# Patient Record
Sex: FEMALE | Race: WHITE | Employment: FULL TIME | URBAN - METROPOLITAN AREA
[De-identification: names, ages, dates, MRNs, and addresses within clinical notes are randomized per-mention and may not be internally consistent; named-entity substitution may affect disease eponyms.]

---

## 2021-09-07 ENCOUNTER — TRANSCRIBE ORDER (OUTPATIENT)
Dept: SCHEDULING | Age: 41
End: 2021-09-07

## 2021-09-07 DIAGNOSIS — Z12.31 SCREENING MAMMOGRAM FOR HIGH-RISK PATIENT: Primary | ICD-10-CM

## 2021-09-10 ENCOUNTER — HOSPITAL ENCOUNTER (OUTPATIENT)
Dept: MAMMOGRAPHY | Age: 41
Discharge: HOME | End: 2021-09-10
Attending: OBSTETRICS & GYNECOLOGY
Payer: COMMERCIAL

## 2021-09-10 DIAGNOSIS — Z12.31 SCREENING MAMMOGRAM FOR HIGH-RISK PATIENT: ICD-10-CM

## 2021-09-10 PROCEDURE — 77063 BREAST TOMOSYNTHESIS BI: CPT

## 2021-10-20 ENCOUNTER — HOSPITAL ENCOUNTER (EMERGENCY)
Age: 41
Discharge: HOME | End: 2021-10-20
Attending: EMERGENCY MEDICINE
Payer: COMMERCIAL

## 2021-10-20 ENCOUNTER — APPOINTMENT (OUTPATIENT)
Dept: CT IMAGING | Age: 41
End: 2021-10-20
Attending: EMERGENCY MEDICINE
Payer: COMMERCIAL

## 2021-10-20 VITALS
SYSTOLIC BLOOD PRESSURE: 146 MMHG | OXYGEN SATURATION: 100 % | TEMPERATURE: 98.4 F | WEIGHT: 195 LBS | BODY MASS INDEX: 35.88 KG/M2 | RESPIRATION RATE: 18 BRPM | HEART RATE: 96 BPM | DIASTOLIC BLOOD PRESSURE: 91 MMHG | HEIGHT: 62 IN

## 2021-10-20 DIAGNOSIS — I10 PRIMARY HYPERTENSION: ICD-10-CM

## 2021-10-20 DIAGNOSIS — G44.209 TENSION-TYPE HEADACHE, NOT INTRACTABLE, UNSPECIFIED CHRONICITY PATTERN: Primary | ICD-10-CM

## 2021-10-20 PROCEDURE — 74011250636 HC RX REV CODE- 250/636: Performed by: EMERGENCY MEDICINE

## 2021-10-20 PROCEDURE — 70450 CT HEAD/BRAIN W/O DYE: CPT

## 2021-10-20 PROCEDURE — 74011250637 HC RX REV CODE- 250/637: Performed by: EMERGENCY MEDICINE

## 2021-10-20 PROCEDURE — 99283 EMERGENCY DEPT VISIT LOW MDM: CPT

## 2021-10-20 PROCEDURE — 96372 THER/PROPH/DIAG INJ SC/IM: CPT

## 2021-10-20 RX ORDER — KETOROLAC TROMETHAMINE 30 MG/ML
30 INJECTION, SOLUTION INTRAMUSCULAR; INTRAVENOUS
Status: COMPLETED | OUTPATIENT
Start: 2021-10-20 | End: 2021-10-20

## 2021-10-20 RX ORDER — BENAZEPRIL HYDROCHLORIDE 40 MG/1
40 TABLET ORAL DAILY
COMMUNITY
Start: 2021-09-15

## 2021-10-20 RX ORDER — METOPROLOL TARTRATE 25 MG/1
25 TABLET, FILM COATED ORAL
Status: COMPLETED | OUTPATIENT
Start: 2021-10-20 | End: 2021-10-20

## 2021-10-20 RX ORDER — ONDANSETRON 4 MG/1
4 TABLET, ORALLY DISINTEGRATING ORAL
Status: COMPLETED | OUTPATIENT
Start: 2021-10-20 | End: 2021-10-20

## 2021-10-20 RX ORDER — CITALOPRAM 20 MG/1
20 TABLET, FILM COATED ORAL DAILY
COMMUNITY
Start: 2021-09-15

## 2021-10-20 RX ORDER — DIVALPROEX SODIUM 500 MG/1
500 TABLET, DELAYED RELEASE ORAL
Status: COMPLETED | OUTPATIENT
Start: 2021-10-20 | End: 2021-10-20

## 2021-10-20 RX ORDER — OXYCODONE AND ACETAMINOPHEN 5; 325 MG/1; MG/1
1 TABLET ORAL
Status: COMPLETED | OUTPATIENT
Start: 2021-10-20 | End: 2021-10-20

## 2021-10-20 RX ORDER — ATORVASTATIN CALCIUM 20 MG/1
20 TABLET, FILM COATED ORAL DAILY
COMMUNITY
Start: 2021-09-15

## 2021-10-20 RX ORDER — AMLODIPINE BESYLATE 5 MG/1
5 TABLET ORAL DAILY
COMMUNITY
Start: 2021-09-15

## 2021-10-20 RX ADMIN — METOPROLOL TARTRATE 25 MG: 25 TABLET, FILM COATED ORAL at 21:38

## 2021-10-20 RX ADMIN — ONDANSETRON 4 MG: 4 TABLET, ORALLY DISINTEGRATING ORAL at 20:37

## 2021-10-20 RX ADMIN — DIVALPROEX SODIUM 500 MG: 500 TABLET, DELAYED RELEASE ORAL at 21:15

## 2021-10-20 RX ADMIN — KETOROLAC TROMETHAMINE 30 MG: 30 INJECTION, SOLUTION INTRAMUSCULAR at 20:37

## 2021-10-20 RX ADMIN — OXYCODONE AND ACETAMINOPHEN 1 TABLET: 5; 325 TABLET ORAL at 21:15

## 2021-10-20 NOTE — ED TRIAGE NOTES
Patient drove to ED; states she is having a severe headache and a slight chill duee to her blood pressure; states she was unable to relieve her headache at home and took her BP meds this morning

## 2021-10-21 NOTE — ED NOTES
PT D/C WITH INSTRUCTIONS AND AMBULATORY. PT MEDICATED FOR ELEVATED BP PRIOR TO D/C. NO IV ACCESS AT THIS TIME. PT VS DOCUMENTED. PT INSTRUCTED TO TAKEN HOME MED AS RX.

## 2021-10-21 NOTE — ED PROVIDER NOTES
Patient presents with complaint of a severe headache , since this AM. This is the worst headache ever. + Nausea and photophobia. Also with elevated blood pressure. Duration:  1 day    Intensity: severe    Modified by: light, noise. Past Medical History:   Diagnosis Date    Hyperlipidemia     Hypertension        Past Surgical History:   Procedure Laterality Date    HX TUBAL LIGATION           Family History:   Problem Relation Age of Onset    Prostate Cancer Father        Social History     Socioeconomic History    Marital status:      Spouse name: Not on file    Number of children: Not on file    Years of education: Not on file    Highest education level: Not on file   Occupational History    Not on file   Tobacco Use    Smoking status: Never Smoker    Smokeless tobacco: Never Used   Vaping Use    Vaping Use: Never used   Substance and Sexual Activity    Alcohol use: Never    Drug use: Never    Sexual activity: Yes     Partners: Male     Birth control/protection: Pill   Other Topics Concern    Not on file   Social History Narrative    Not on file     Social Determinants of Health     Financial Resource Strain:     Difficulty of Paying Living Expenses:    Food Insecurity:     Worried About Running Out of Food in the Last Year:     [address] in the Last Year:    Transportation Needs:     Lack of Transportation (Medical):      Lack of Transportation (Non-Medical):    Physical Activity:     Days of Exercise per Week:     Minutes of Exercise per Session:    Stress:     Feeling of Stress :    Social Connections:     Frequency of Communication with Friends and Family:     Frequency of Social Gatherings with Friends and Family:     Attends [employer] Services:     Active Member of Clubs or Organizations:     Attends Club or Organization Meetings:     Marital Status:    Intimate Partner Violence:     Fear of Current or Ex-Partner:     Emotionally Abused:     Physically Abused:     Sexually Abused: ALLERGIES: Patient has no known allergies. Review of Systems   Constitutional: Negative. Negative for chills and fever. HENT: Negative. Eyes: Positive for photophobia. Respiratory: Negative. Cardiovascular: Negative. Gastrointestinal: Positive for nausea and vomiting. Endocrine: Negative. Genitourinary: Negative. Skin: Negative. Allergic/Immunologic: Negative. Neurological: Positive for headaches. Hematological: Negative. Psychiatric/Behavioral: Negative. All other systems reviewed and are negative. Vitals:    10/20/21 1946 10/20/21 2014   BP: (!) 181/101 (!) 147/77   Pulse: (!) 110 100   Resp: 20 18   Temp: 98.4 °F (36.9 °C)    SpO2: 98% 100%   Weight: 88.5 kg (195 lb)    Height: 5' 2\" (1.575 m)             Physical Exam  Vitals and nursing note reviewed. Constitutional:       Appearance: She is well-developed. HENT:      Head: Normocephalic and atraumatic. Eyes:      Extraocular Movements: Extraocular movements intact. Pupils: Pupils are equal, round, and reactive to light. Cardiovascular:      Rate and Rhythm: Normal rate and regular rhythm. Heart sounds: Normal heart sounds. Pulmonary:      Breath sounds: Normal breath sounds. Abdominal:      General: Bowel sounds are normal.      Palpations: Abdomen is soft. Musculoskeletal:         General: Normal range of motion. Cervical back: Normal range of motion and neck supple. Neurological:      General: No focal deficit present. Mental Status: She is alert.    Psychiatric:         Mood and Affect: Mood normal.         Behavior: Behavior normal.          MDM       Procedures

## 2021-11-09 ENCOUNTER — HOSPITAL ENCOUNTER (EMERGENCY)
Age: 41
Discharge: ANOTHER HOSPITAL | End: 2021-11-10
Attending: EMERGENCY MEDICINE
Payer: COMMERCIAL

## 2021-11-09 DIAGNOSIS — K62.5 GASTROINTESTINAL HEMORRHAGE ASSOCIATED WITH ANORECTAL SOURCE: Primary | ICD-10-CM

## 2021-11-09 LAB
ALBUMIN SERPL-MCNC: 3.4 G/DL (ref 3.5–5)
ALBUMIN/GLOB SERPL: 0.9 {RATIO} (ref 1.1–2.2)
ALP SERPL-CCNC: 111 U/L (ref 45–117)
ALT SERPL-CCNC: 24 U/L (ref 12–78)
ANION GAP SERPL CALC-SCNC: 11 MMOL/L (ref 5–15)
APPEARANCE UR: CLEAR
APTT PPP: 21.5 SEC (ref 22.1–31)
AST SERPL W P-5'-P-CCNC: 15 U/L (ref 15–37)
BASOPHILS # BLD: 0.1 K/UL (ref 0–0.2)
BASOPHILS NFR BLD: 1 % (ref 0–2.5)
BILIRUB SERPL-MCNC: 0.6 MG/DL (ref 0.2–1)
BILIRUB UR QL: NEGATIVE
BUN SERPL-MCNC: 19 MG/DL (ref 6–20)
BUN/CREAT SERPL: 20 (ref 12–20)
CA-I BLD-MCNC: 9.1 MG/DL (ref 8.5–10.1)
CHLORIDE SERPL-SCNC: 104 MMOL/L (ref 97–108)
CO2 SERPL-SCNC: 25 MMOL/L (ref 21–32)
COLOR UR: ABNORMAL
CREAT SERPL-MCNC: 0.95 MG/DL (ref 0.55–1.02)
EOSINOPHIL # BLD: 0.1 K/UL (ref 0–0.7)
EOSINOPHIL NFR BLD: 1 % (ref 0.9–2.9)
ERYTHROCYTE [DISTWIDTH] IN BLOOD BY AUTOMATED COUNT: 11.8 % (ref 11.5–14.5)
GLOBULIN SER CALC-MCNC: 4 G/DL (ref 2–4)
GLUCOSE SERPL-MCNC: 132 MG/DL (ref 65–100)
GLUCOSE UR STRIP.AUTO-MCNC: NEGATIVE MG/DL
HCG UR QL: NEGATIVE
HCT VFR BLD AUTO: 42.7 % (ref 36–46)
HEMOCCULT SP1 STL QL: POSITIVE
HGB BLD-MCNC: 14.6 G/DL (ref 13.5–17.5)
HGB UR QL STRIP: ABNORMAL
INR PPP: 1 (ref 0.9–1.1)
KETONES UR QL STRIP.AUTO: NEGATIVE MG/DL
LEUKOCYTE ESTERASE UR QL STRIP.AUTO: ABNORMAL
LYMPHOCYTES # BLD: 3 K/UL (ref 1–4.8)
LYMPHOCYTES NFR BLD: 26 % (ref 20.5–51.1)
MCH RBC QN AUTO: 30.8 PG (ref 31–34)
MCHC RBC AUTO-ENTMCNC: 34.2 G/DL (ref 31–36)
MCV RBC AUTO: 90.2 FL (ref 80–100)
MONOCYTES # BLD: 0.3 K/UL (ref 0–0.8)
MONOCYTES NFR BLD: 2 % (ref 1.7–9.3)
NEUTS SEG # BLD: 8 K/UL (ref 1.8–7.7)
NEUTS SEG NFR BLD: 70 % (ref 42–75)
NITRITE UR QL STRIP.AUTO: NEGATIVE
PH UR STRIP: 6 [PH] (ref 5–8)
PLATELET # BLD AUTO: 267 K/UL
PMV BLD AUTO: 7.4 FL (ref 6.5–11.5)
POTASSIUM SERPL-SCNC: 3.4 MMOL/L (ref 3.5–5.1)
PROT SERPL-MCNC: 7.4 G/DL (ref 6.4–8.2)
PROT UR STRIP-MCNC: NEGATIVE MG/DL
PROTHROMBIN TIME: 9.9 SEC (ref 9–11.1)
RBC # BLD AUTO: 4.74 M/UL
SODIUM SERPL-SCNC: 140 MMOL/L (ref 136–145)
SP GR UR REFRACTOMETRY: 1.02 (ref 1–1.03)
THERAPEUTIC RANGE,PTTT: ABNORMAL SEC (ref 82–109)
UROBILINOGEN UR QL STRIP.AUTO: 0.2 EU/DL (ref 0.2–1)
WBC # BLD AUTO: 11.5 K/UL (ref 4.4–11.3)

## 2021-11-09 PROCEDURE — 86901 BLOOD TYPING SEROLOGIC RH(D): CPT

## 2021-11-09 PROCEDURE — 81025 URINE PREGNANCY TEST: CPT

## 2021-11-09 PROCEDURE — 85610 PROTHROMBIN TIME: CPT

## 2021-11-09 PROCEDURE — 85730 THROMBOPLASTIN TIME PARTIAL: CPT

## 2021-11-09 PROCEDURE — 36415 COLL VENOUS BLD VENIPUNCTURE: CPT

## 2021-11-09 PROCEDURE — C9113 INJ PANTOPRAZOLE SODIUM, VIA: HCPCS | Performed by: EMERGENCY MEDICINE

## 2021-11-09 PROCEDURE — 80053 COMPREHEN METABOLIC PANEL: CPT

## 2021-11-09 PROCEDURE — 82272 OCCULT BLD FECES 1-3 TESTS: CPT

## 2021-11-09 PROCEDURE — 81001 URINALYSIS AUTO W/SCOPE: CPT

## 2021-11-09 PROCEDURE — 85025 COMPLETE CBC W/AUTO DIFF WBC: CPT

## 2021-11-09 PROCEDURE — 99284 EMERGENCY DEPT VISIT MOD MDM: CPT

## 2021-11-09 PROCEDURE — 74011000250 HC RX REV CODE- 250: Performed by: EMERGENCY MEDICINE

## 2021-11-09 PROCEDURE — 74011250636 HC RX REV CODE- 250/636: Performed by: EMERGENCY MEDICINE

## 2021-11-09 PROCEDURE — 96374 THER/PROPH/DIAG INJ IV PUSH: CPT

## 2021-11-09 RX ORDER — SODIUM CHLORIDE 9 MG/ML
150 INJECTION, SOLUTION INTRAVENOUS ONCE
Status: COMPLETED | OUTPATIENT
Start: 2021-11-09 | End: 2021-11-09

## 2021-11-09 RX ORDER — LEVONORGESTREL / ETHINYL ESTRADIOL 0.15-0.03
1 KIT ORAL DAILY
COMMUNITY
Start: 2021-10-01

## 2021-11-09 RX ADMIN — SODIUM CHLORIDE 40 MG: 9 INJECTION INTRAMUSCULAR; INTRAVENOUS; SUBCUTANEOUS at 23:27

## 2021-11-09 RX ADMIN — SODIUM CHLORIDE 150 ML/HR: 9 INJECTION, SOLUTION INTRAVENOUS at 23:28

## 2021-11-10 ENCOUNTER — HOSPITAL ENCOUNTER (OUTPATIENT)
Age: 41
Setting detail: OBSERVATION
Discharge: HOME | End: 2021-11-12
Attending: EMERGENCY MEDICINE | Admitting: HOSPITALIST
Payer: COMMERCIAL

## 2021-11-10 ENCOUNTER — APPOINTMENT (OUTPATIENT)
Dept: CT IMAGING | Age: 41
End: 2021-11-10
Attending: EMERGENCY MEDICINE
Payer: COMMERCIAL

## 2021-11-10 VITALS
TEMPERATURE: 98.4 F | BODY MASS INDEX: 36.25 KG/M2 | HEIGHT: 62 IN | RESPIRATION RATE: 16 BRPM | SYSTOLIC BLOOD PRESSURE: 157 MMHG | WEIGHT: 197 LBS | HEART RATE: 93 BPM | OXYGEN SATURATION: 100 % | DIASTOLIC BLOOD PRESSURE: 99 MMHG

## 2021-11-10 DIAGNOSIS — K92.2 GASTROINTESTINAL HEMORRHAGE, UNSPECIFIED GASTROINTESTINAL HEMORRHAGE TYPE: Primary | ICD-10-CM

## 2021-11-10 LAB
ABO + RH BLD: NORMAL
ABO + RH BLD: NORMAL
ANION GAP SERPL CALC-SCNC: 8 MMOL/L (ref 5–15)
BACTERIA URNS QL MICRO: ABNORMAL /HPF
BASOPHILS # BLD: 0 K/UL (ref 0–0.1)
BASOPHILS NFR BLD: 0 % (ref 0–1)
BLOOD GROUP ANTIBODIES SERPL: NEGATIVE
BLOOD GROUP ANTIBODIES SERPL: NEGATIVE
BUN SERPL-MCNC: 15 MG/DL (ref 6–20)
BUN/CREAT SERPL: 25 (ref 12–20)
CA-I BLD-MCNC: 8 MG/DL (ref 8.5–10.1)
CHLORIDE SERPL-SCNC: 112 MMOL/L (ref 97–108)
CO2 SERPL-SCNC: 21 MMOL/L (ref 21–32)
CREAT SERPL-MCNC: 0.59 MG/DL (ref 0.55–1.02)
CRP SERPL-MCNC: 2.69 MG/DL (ref 0–0.6)
DIFFERENTIAL METHOD BLD: ABNORMAL
EOSINOPHIL # BLD: 0.1 K/UL (ref 0–0.4)
EOSINOPHIL NFR BLD: 1 % (ref 0–7)
ERYTHROCYTE [DISTWIDTH] IN BLOOD BY AUTOMATED COUNT: 12.1 % (ref 11.5–14.5)
ERYTHROCYTE [SEDIMENTATION RATE] IN BLOOD: 28 MM/HR
GLUCOSE SERPL-MCNC: 90 MG/DL (ref 65–100)
HCT VFR BLD AUTO: 37.9 % (ref 35–47)
HCT VFR BLD AUTO: 39 % (ref 35–47)
HCT VFR BLD AUTO: 39.2 % (ref 35–47)
HGB BLD-MCNC: 12.9 G/DL (ref 11.5–16)
HGB BLD-MCNC: 13.2 G/DL (ref 11.5–16)
HGB BLD-MCNC: 13.3 G/DL (ref 11.5–16)
IMM GRANULOCYTES # BLD AUTO: 0 K/UL (ref 0–0.04)
IMM GRANULOCYTES NFR BLD AUTO: 0 % (ref 0–0.5)
LYMPHOCYTES # BLD: 2.5 K/UL (ref 0.8–3.5)
LYMPHOCYTES NFR BLD: 26 % (ref 12–49)
MCH RBC QN AUTO: 30.1 PG (ref 26–34)
MCHC RBC AUTO-ENTMCNC: 33.8 G/DL (ref 30–36.5)
MCV RBC AUTO: 88.8 FL (ref 80–99)
MONOCYTES # BLD: 0.4 K/UL (ref 0–1)
MONOCYTES NFR BLD: 4 % (ref 5–13)
NEUTS SEG # BLD: 6.6 K/UL (ref 1.8–8)
NEUTS SEG NFR BLD: 69 % (ref 32–75)
NRBC # BLD: 0 K/UL (ref 0–0.01)
NRBC BLD-RTO: 0 PER 100 WBC
PLATELET # BLD AUTO: 269 K/UL (ref 150–400)
PMV BLD AUTO: 9.3 FL (ref 8.9–12.9)
POTASSIUM SERPL-SCNC: 3.6 MMOL/L (ref 3.5–5.1)
RBC # BLD AUTO: 4.39 M/UL (ref 3.8–5.2)
RBC #/AREA URNS HPF: ABNORMAL /HPF (ref 0–3)
SODIUM SERPL-SCNC: 141 MMOL/L (ref 136–145)
SPECIMEN EXP DATE BLD: NORMAL
SPECIMEN EXP DATE BLD: NORMAL
TSH SERPL DL<=0.05 MIU/L-ACNC: 0.99 UIU/ML (ref 0.36–3.74)
WBC # BLD AUTO: 9.7 K/UL (ref 3.6–11)
WBC URNS QL MICRO: ABNORMAL /HPF (ref 0–5)

## 2021-11-10 PROCEDURE — 74011000636 HC RX REV CODE- 636: Performed by: EMERGENCY MEDICINE

## 2021-11-10 PROCEDURE — 86140 C-REACTIVE PROTEIN: CPT

## 2021-11-10 PROCEDURE — G0378 HOSPITAL OBSERVATION PER HR: HCPCS

## 2021-11-10 PROCEDURE — 74011250636 HC RX REV CODE- 250/636: Performed by: NURSE PRACTITIONER

## 2021-11-10 PROCEDURE — 36415 COLL VENOUS BLD VENIPUNCTURE: CPT

## 2021-11-10 PROCEDURE — 99284 EMERGENCY DEPT VISIT MOD MDM: CPT

## 2021-11-10 PROCEDURE — 74174 CTA ABD&PLVS W/CONTRAST: CPT

## 2021-11-10 PROCEDURE — 84443 ASSAY THYROID STIM HORMONE: CPT

## 2021-11-10 PROCEDURE — 85652 RBC SED RATE AUTOMATED: CPT

## 2021-11-10 PROCEDURE — 80048 BASIC METABOLIC PNL TOTAL CA: CPT

## 2021-11-10 PROCEDURE — 74011250637 HC RX REV CODE- 250/637: Performed by: NURSE PRACTITIONER

## 2021-11-10 PROCEDURE — 74011250636 HC RX REV CODE- 250/636: Performed by: HOSPITALIST

## 2021-11-10 PROCEDURE — 85014 HEMATOCRIT: CPT

## 2021-11-10 PROCEDURE — 85025 COMPLETE CBC W/AUTO DIFF WBC: CPT

## 2021-11-10 PROCEDURE — 86901 BLOOD TYPING SEROLOGIC RH(D): CPT

## 2021-11-10 PROCEDURE — 96374 THER/PROPH/DIAG INJ IV PUSH: CPT

## 2021-11-10 PROCEDURE — 74011250637 HC RX REV CODE- 250/637: Performed by: HOSPITALIST

## 2021-11-10 PROCEDURE — 74011000250 HC RX REV CODE- 250: Performed by: NURSE PRACTITIONER

## 2021-11-10 RX ORDER — SODIUM CHLORIDE 0.9 % (FLUSH) 0.9 %
5-40 SYRINGE (ML) INJECTION AS NEEDED
Status: DISCONTINUED | OUTPATIENT
Start: 2021-11-10 | End: 2021-11-10

## 2021-11-10 RX ORDER — LEVONORGESTREL AND ETHINYL ESTRADIOL 0.15-0.03
1 KIT ORAL DAILY
Status: DISCONTINUED | OUTPATIENT
Start: 2021-11-10 | End: 2021-11-12 | Stop reason: HOSPADM

## 2021-11-10 RX ORDER — ACETAMINOPHEN 325 MG/1
650 TABLET ORAL
Status: DISCONTINUED | OUTPATIENT
Start: 2021-11-10 | End: 2021-11-12 | Stop reason: HOSPADM

## 2021-11-10 RX ORDER — LISINOPRIL 40 MG/1
40 TABLET ORAL DAILY
Status: DISCONTINUED | OUTPATIENT
Start: 2021-11-10 | End: 2021-11-12 | Stop reason: HOSPADM

## 2021-11-10 RX ORDER — METRONIDAZOLE 500 MG/1
500 TABLET ORAL 3 TIMES DAILY
Status: DISCONTINUED | OUTPATIENT
Start: 2021-11-10 | End: 2021-11-12 | Stop reason: HOSPADM

## 2021-11-10 RX ORDER — SODIUM CHLORIDE 0.9 % (FLUSH) 0.9 %
5-40 SYRINGE (ML) INJECTION EVERY 8 HOURS
Status: DISCONTINUED | OUTPATIENT
Start: 2021-11-10 | End: 2021-11-11

## 2021-11-10 RX ORDER — CITALOPRAM 20 MG/1
20 TABLET, FILM COATED ORAL DAILY
Status: DISCONTINUED | OUTPATIENT
Start: 2021-11-10 | End: 2021-11-12 | Stop reason: HOSPADM

## 2021-11-10 RX ORDER — AMLODIPINE BESYLATE 5 MG/1
5 TABLET ORAL DAILY
Status: DISCONTINUED | OUTPATIENT
Start: 2021-11-10 | End: 2021-11-12 | Stop reason: HOSPADM

## 2021-11-10 RX ORDER — ATORVASTATIN CALCIUM 20 MG/1
20 TABLET, FILM COATED ORAL DAILY
Status: DISCONTINUED | OUTPATIENT
Start: 2021-11-10 | End: 2021-11-12 | Stop reason: HOSPADM

## 2021-11-10 RX ORDER — SODIUM CHLORIDE 9 MG/ML
1000 INJECTION, SOLUTION INTRAVENOUS CONTINUOUS
Status: DISCONTINUED | OUTPATIENT
Start: 2021-11-10 | End: 2021-11-12 | Stop reason: HOSPADM

## 2021-11-10 RX ORDER — CIPROFLOXACIN 2 MG/ML
400 INJECTION, SOLUTION INTRAVENOUS EVERY 12 HOURS
Status: DISCONTINUED | OUTPATIENT
Start: 2021-11-10 | End: 2021-11-11

## 2021-11-10 RX ORDER — ONDANSETRON 4 MG/1
4 TABLET, ORALLY DISINTEGRATING ORAL
Status: DISCONTINUED | OUTPATIENT
Start: 2021-11-10 | End: 2021-11-12 | Stop reason: HOSPADM

## 2021-11-10 RX ADMIN — IOPAMIDOL 100 ML: 755 INJECTION, SOLUTION INTRAVENOUS at 02:44

## 2021-11-10 RX ADMIN — METRONIDAZOLE 500 MG: 500 TABLET ORAL at 21:53

## 2021-11-10 RX ADMIN — ACETAMINOPHEN 650 MG: 325 TABLET ORAL at 21:53

## 2021-11-10 RX ADMIN — CITALOPRAM HYDROBROMIDE 20 MG: 20 TABLET ORAL at 11:42

## 2021-11-10 RX ADMIN — METRONIDAZOLE 500 MG: 500 TABLET ORAL at 17:01

## 2021-11-10 RX ADMIN — LISINOPRIL 40 MG: 40 TABLET ORAL at 11:43

## 2021-11-10 RX ADMIN — SODIUM CHLORIDE 1000 ML: 9 INJECTION, SOLUTION INTRAVENOUS at 05:49

## 2021-11-10 RX ADMIN — ATORVASTATIN CALCIUM 20 MG: 20 TABLET, FILM COATED ORAL at 11:42

## 2021-11-10 RX ADMIN — AMLODIPINE BESYLATE 5 MG: 5 TABLET ORAL at 11:41

## 2021-11-10 RX ADMIN — CIPROFLOXACIN 400 MG: 2 INJECTION, SOLUTION INTRAVENOUS at 16:45

## 2021-11-10 RX ADMIN — SODIUM CHLORIDE 1000 ML: 9 INJECTION, SOLUTION INTRAVENOUS at 09:45

## 2021-11-10 RX ADMIN — POLYETHYLENE GLYCOL 3350, SODIUM SULFATE ANHYDROUS, SODIUM BICARBONATE, SODIUM CHLORIDE, POTASSIUM CHLORIDE 4 L: 236; 22.74; 6.74; 5.86; 2.97 POWDER, FOR SOLUTION ORAL at 20:51

## 2021-11-10 NOTE — PROGRESS NOTES
Problem: Upper and Lower GI Bleed: Day 1  Goal: Consults, if ordered  Outcome: Progressing Towards Goal  Goal: Diagnostic Test/Procedures  Outcome: Progressing Towards Goal  Goal: Nutrition/Diet  Outcome: Progressing Towards Goal  Goal: Medications  Outcome: Progressing Towards Goal  Goal: Psychosocial  Outcome: Progressing Towards Goal

## 2021-11-10 NOTE — ED PROVIDER NOTES
Patient presents with complaint of blood in her stools since this AM. She notes lower abdominal cramping and loose bloody stools. No nausea or vomiting. In the past she had bloood streaks with hard stools , but never like this. Past Medical History:   Diagnosis Date    Hyperlipidemia     Hypertension        Past Surgical History:   Procedure Laterality Date    HX TUBAL LIGATION           Family History:   Problem Relation Age of Onset    Prostate Cancer Father        Social History     Socioeconomic History    Marital status:      Spouse name: Not on file    Number of children: Not on file    Years of education: Not on file    Highest education level: Not on file   Occupational History    Not on file   Tobacco Use    Smoking status: Never Smoker    Smokeless tobacco: Never Used   Vaping Use    Vaping Use: Never used   Substance and Sexual Activity    Alcohol use: Never    Drug use: Never    Sexual activity: Yes     Partners: Male     Birth control/protection: Pill   Other Topics Concern    Not on file   Social History Narrative    Not on file     Social Determinants of Health     Financial Resource Strain:     Difficulty of Paying Living Expenses: Not on file   Food Insecurity:     Worried About [address] in the Last Year: Not on file    [address] in the Last Year: Not on file   Transportation Needs:     Lack of Transportation (Medical): Not on file    Lack of Transportation (Non-Medical):  Not on file   Physical Activity:     Days of Exercise per Week: Not on file    Minutes of Exercise per Session: Not on file   Stress:     Feeling of Stress : Not on file   Social Connections:     Frequency of Communication with Friends and Family: Not on file    Frequency of Social Gatherings with Friends and Family: Not on file    Attends Confucianism Services: Not on file    Active Member of Clubs or Organizations: Not on file    Attends Club or Organization Meetings: Not on file    Marital Status: Not on file   Intimate Partner Violence:     Fear of Current or Ex-Partner: Not on file    Emotionally Abused: Not on file    Physically Abused: Not on file    Sexually Abused: Not on file   Housing Stability:     Unable to Pay for Housing in the Last Year: Not on file    Number of [place] in the Last Year: Not on file    Unstable Housing in the Last Year: Not on file         ALLERGIES: Patient has no known allergies. Review of Systems   Constitutional: Negative. HENT: Negative. Eyes: Negative. Respiratory: Negative. Cardiovascular: Negative. Gastrointestinal: Positive for abdominal pain and blood in stool. Endocrine: Negative. Genitourinary: Negative. Skin: Negative. Allergic/Immunologic: Negative. Neurological: Negative. Hematological: Negative. Psychiatric/Behavioral: Negative. All other systems reviewed and are negative. Vitals:    11/09/21 2232   BP: (!) 155/105   Pulse: 100   Resp: 19   Temp: 98.9 °F (37.2 °C)   SpO2: 97%   Weight: 89.4 kg (197 lb)   Height: 5' 2\" (1.575 m)            Physical Exam  Vitals and nursing note reviewed. Constitutional:       Appearance: She is well-developed. HENT:      Head: Normocephalic and atraumatic. Cardiovascular:      Rate and Rhythm: Normal rate and regular rhythm. Heart sounds: Normal heart sounds. Pulmonary:      Breath sounds: Normal breath sounds. Abdominal:      General: Bowel sounds are normal.      Palpations: Abdomen is soft. Comments: Small external hemorrhoid. Gross blood on rectal exam   Musculoskeletal:         General: Normal range of motion. Cervical back: Normal range of motion and neck supple. Neurological:      General: No focal deficit present. Mental Status: She is alert.    Psychiatric:         Mood and Affect: Mood normal.         Behavior: Behavior normal.          MDM  Number of Diagnoses or Management Options  Risk of Complications, Morbidity, and/or Mortality  Presenting problems: moderate  Diagnostic procedures: moderate  Management options: moderate  General comments: Discussed with Dr Layne Esquivel at [facility] ER  and she accepts patient as a transfer.             Procedures

## 2021-11-10 NOTE — ED NOTES
Patient departed ED via [facility] in good condition for transfer to [facility] ED at this time.

## 2021-11-10 NOTE — ED PROVIDER NOTES
EMERGENCY DEPARTMENT HISTORY AND PHYSICAL EXAM      Date: 11/10/2021  Patient Name: Yoselin Redmond      History of Presenting Illness     Chief Complaint   Patient presents with    Transfer Of Care    Indigestion       History Provided By: Patient    HPI: Yoselin Redmond, 42 y.o. female with a past medical history significant hypertension and hyperlipidemia presents to the ED with cc of bleeding. Patient was a transfer here. She has never had a colonoscopy and no history of rectal bleeding. Has never been diagnosed with diverticulosis. States she is having small clots and bright red blood per rectum. Endorses some mild lower pelvic pain but otherwise has been doing well. She has tolerated her diet. No vomiting. There are no other complaints, changes, or physical findings at this time.     PCP: Jennifer Farooq MD    Current Facility-Administered Medications   Medication Dose Route Frequency Provider Last Rate Last Admin    atorvastatin (LIPITOR) tablet 20 mg  20 mg Oral DAILY Wilda Roberts MD        amLODIPine (NORVASC) tablet 5 mg  5 mg Oral DAILY Wilda Roberts MD        citalopram (CELEXA) tablet 20 mg  20 mg Oral DAILY Wilda Roberts MD        lisinopriL (PRINIVIL, ZESTRIL) tablet 40 mg (Patient Supplied)  40 mg Oral DAILY Wilda Roberts MD        levonorgestrel-ethinyl estradiol (SEASONALE) 0.15 mg-30 mcg (91) per tablet 1 Tablet  1 Tablet Oral DAILY Wilda Roberts MD        0.9% sodium chloride infusion 1,000 mL  1,000 mL IntraVENous CONTINUOUS Wilda Roberts MD 75 mL/hr at 11/10/21 0549 1,000 mL at 11/10/21 0549    sodium chloride (NS) flush 5-40 mL  5-40 mL IntraVENous Q8H Wilda Roberts MD        sodium chloride (NS) flush 5-40 mL  5-40 mL IntraVENous PRN Wilda Roberts MD        ondansetron (ZOFRAN ODT) tablet 4 mg  4 mg Oral Q4H PRN Wilda Roberts MD           Past History     Past Medical History:  Past Medical History:   Diagnosis Date    Hyperlipidemia     Hypertension        Past Surgical History:  Past Surgical History:   Procedure Laterality Date    HX TUBAL LIGATION         Family History:  Family History   Problem Relation Age of Onset    Prostate Cancer Father        Social History:  Social History     Tobacco Use    Smoking status: Never Smoker    Smokeless tobacco: Never Used   Vaping Use    Vaping Use: Never used   Substance Use Topics    Alcohol use: Never    Drug use: Never       Allergies:  No Known Allergies      Review of Systems     Review of Systems   Constitutional: Negative for activity change and fever. HENT: Negative for rhinorrhea and sore throat. Eyes: Negative for visual disturbance. Respiratory: Negative for cough. Cardiovascular: Negative for chest pain. Gastrointestinal: Positive for abdominal pain and rectal pain. Negative for diarrhea, nausea and vomiting. Genitourinary: Negative for dysuria. Musculoskeletal: Negative for arthralgias and myalgias. Skin: Negative for rash and wound. Neurological: Negative for syncope and headaches. Psychiatric/Behavioral: Negative for confusion. All other systems reviewed and are negative. Physical Exam     Physical Exam  Vitals and nursing note reviewed. Constitutional:       Appearance: Normal appearance. She is normal weight. HENT:      Head: Normocephalic and atraumatic. Nose: Nose normal.      Mouth/Throat:      Mouth: Mucous membranes are moist.   Eyes:      Conjunctiva/sclera: Conjunctivae normal.   Cardiovascular:      Rate and Rhythm: Normal rate. Pulses: Normal pulses. Pulmonary:      Effort: Pulmonary effort is normal. No respiratory distress. Abdominal:      Tenderness: There is abdominal tenderness (Mild suprapubic). Musculoskeletal:         General: No swelling or deformity. Normal range of motion. Skin:     General: Skin is warm and dry. Coloration: Skin is not pale. Findings: No rash. Neurological:      General: No focal deficit present. Mental Status: She is alert. Psychiatric:         Mood and Affect: Mood normal.         Behavior: Behavior normal.         Lab and Diagnostic Study Results     Labs -     Recent Results (from the past 12 hour(s))   URINALYSIS W/ RFLX MICROSCOPIC    Collection Time: 11/09/21 10:45 PM   Result Value Ref Range    Color Yellow/Straw      Appearance Clear Clear      Specific gravity 1.025 1.003 - 1.030      pH (UA) 6.0 5.0 - 8.0      Protein Negative Negative mg/dL    Glucose Negative Negative mg/dL    Ketone Negative Negative mg/dL    Bilirubin Negative Negative      Blood Trace (A) Negative      Urobilinogen 0.2 0.2 - 1.0 EU/dL    Nitrites Negative Negative      Leukocyte Esterase Trace (A) Negative     HCG URINE, QL    Collection Time: 11/09/21 10:45 PM   Result Value Ref Range    HCG urine, QL Negative Negative     URINE MICROSCOPIC    Collection Time: 11/09/21 10:45 PM   Result Value Ref Range    WBC 5-10 0 - 5 /hpf    RBC 5-10 0 - 3 /hpf    Bacteria 2+ (A) Negative /hpf   CBC WITH AUTOMATED DIFF    Collection Time: 11/09/21 10:55 PM   Result Value Ref Range    WBC 11.5 (H) 4.4 - 11.3 K/uL    RBC 4.74 M/uL    HGB 14.6 13.5 - 17.5 g/dL    HCT 42.7 36 - 46 %    MCV 90.2 80 - 100 FL    MCH 30.8 (L) 31 - 34 PG    MCHC 34.2 31.0 - 36.0 g/dL    RDW 11.8 11.5 - 14.5 %    PLATELET 606 K/uL    MPV 7.4 6.5 - 11.5 FL    NEUTROPHILS 70 42 - 75 %    LYMPHOCYTES 26 20.5 - 51.1 %    MONOCYTES 2 1.7 - 9.3 %    EOSINOPHILS 1 0.9 - 2.9 %    BASOPHILS 1 0.0 - 2.5 %    ABS. NEUTROPHILS 8.0 (H) 1.8 - 7.7 K/UL    ABS. LYMPHOCYTES 3.0 1.0 - 4.8 K/UL    ABS. MONOCYTES 0.3 0.0 - 0.8 K/UL    ABS. EOSINOPHILS 0.1 0.0 - 0.7 K/UL    ABS.  BASOPHILS 0.1 0.0 - 0.2 K/UL   METABOLIC PANEL, COMPREHENSIVE    Collection Time: 11/09/21 10:55 PM   Result Value Ref Range    Sodium 140 136 - 145 mmol/L    Potassium 3.4 (L) 3.5 - 5.1 mmol/L    Chloride 104 97 - 108 mmol/L CO2 25 21 - 32 mmol/L    Anion gap 11 5 - 15 mmol/L    Glucose 132 (H) 65 - 100 mg/dL    BUN 19 6 - 20 mg/dL    Creatinine 0.95 0.55 - 1.02 mg/dL    BUN/Creatinine ratio 20 12 - 20      GFR est AA >60 >60 ml/min/1.73m2    GFR est non-AA >60 >60 ml/min/1.73m2    Calcium 9.1 8.5 - 10.1 mg/dL    Bilirubin, total 0.6 0.2 - 1.0 mg/dL    AST (SGOT) 15 15 - 37 U/L    ALT (SGPT) 24 12 - 78 U/L    Alk. phosphatase 111 45 - 117 U/L    Protein, total 7.4 6.4 - 8.2 g/dL    Albumin 3.4 (L) 3.5 - 5.0 g/dL    Globulin 4.0 2.0 - 4.0 g/dL    A-G Ratio 0.9 (L) 1.1 - 2.2     PROTHROMBIN TIME + INR    Collection Time: 11/09/21 10:55 PM   Result Value Ref Range    Prothrombin time 9.9 9.0 - 11.1 sec    INR 1.0 0.9 - 1.1     PTT    Collection Time: 11/09/21 10:55 PM   Result Value Ref Range    aPTT 21.5 (L) 22.1 - 31.0 sec    aPTT, therapeutic range   82 - 109 sec   TYPE & SCREEN    Collection Time: 11/09/21 11:00 PM   Result Value Ref Range    Crossmatch Expiration 11/12/2021,2359     ABO/Rh(D) O Positive     Antibody screen Negative    OCCULT BLOOD, STOOL    Collection Time: 11/09/21 11:00 PM   Result Value Ref Range    Occult Blood,day 1 Positive     CBC WITH AUTOMATED DIFF    Collection Time: 11/10/21  1:46 AM   Result Value Ref Range    WBC 9.7 3.6 - 11.0 K/uL    RBC 4.39 3.80 - 5.20 M/uL    HGB 13.2 11.5 - 16.0 g/dL    HCT 39.0 35.0 - 47.0 %    MCV 88.8 80.0 - 99.0 FL    MCH 30.1 26.0 - 34.0 PG    MCHC 33.8 30.0 - 36.5 g/dL    RDW 12.1 11.5 - 14.5 %    PLATELET 880 883 - 191 K/uL    MPV 9.3 8.9 - 12.9 FL    NRBC 0.0 0.0  WBC    ABSOLUTE NRBC 0.00 0.00 - 0.01 K/uL    NEUTROPHILS 69 32 - 75 %    LYMPHOCYTES 26 12 - 49 %    MONOCYTES 4 (L) 5 - 13 %    EOSINOPHILS 1 0 - 7 %    BASOPHILS 0 0 - 1 %    IMMATURE GRANULOCYTES 0 0 - 0.5 %    ABS. NEUTROPHILS 6.6 1.8 - 8.0 K/UL    ABS. LYMPHOCYTES 2.5 0.8 - 3.5 K/UL    ABS. MONOCYTES 0.4 0.0 - 1.0 K/UL    ABS. EOSINOPHILS 0.1 0.0 - 0.4 K/UL    ABS. BASOPHILS 0.0 0.0 - 0.1 K/UL    ABS. IMM. Laveda Vici. 0.0 0.00 - 0.04 K/UL    DF AUTOMATED     METABOLIC PANEL, BASIC    Collection Time: 11/10/21  1:46 AM   Result Value Ref Range    Sodium 141 136 - 145 mmol/L    Potassium 3.6 3.5 - 5.1 mmol/L    Chloride 112 (H) 97 - 108 mmol/L    CO2 21 21 - 32 mmol/L    Anion gap 8 5 - 15 mmol/L    Glucose 90 65 - 100 mg/dL    BUN 15 6 - 20 mg/dL    Creatinine 0.59 0.55 - 1.02 mg/dL    BUN/Creatinine ratio 25 (H) 12 - 20      GFR est AA >60 >60 ml/min/1.73m2    GFR est non-AA >60 >60 ml/min/1.73m2    Calcium 8.0 (L) 8.5 - 10.1 mg/dL       Radiologic Studies -   [unfilled]  CT Results  (Last 48 hours)               11/10/21 0244  CTA ABDOMEN PELV W CONT Final result    Impression:  Normal CT angiogram of the abdomen and pelvis. No evidence of GI   hemorrhage. There is however nonspecific colitis affecting the distal portions   of the colon as described. Considerations are for infectious, inflammatory or   even ischemic colitis; ischemic colitis would not be excluded by this exam   although the vasculature as visualized appears intact. Nonobstructing left   nephrolithiasis. Narrative:  HISTORY:  EVAL FOR DIVERTICULAR BLEED   Dose reduction technique: All CT scans at this facility are performed using dose reduction optimization   technique as appropriate on the exam including the following: Automated exposure   control, adjustment of the MA and/or KV according to patient size of use of   iterative reconstructive technique. .       TECHNIQUE: CTA ABDOMEN PELV W CONT. CT angiogram of the abdomen and pelvis   performed and maximum intensity projection images (MIPS) generated. 100 cc [id number]-[id number] injected. COMPARISON: 5/27/2018   LIMITATIONS: None       CHEST: No acute airspace process or pleural effusion seen at the lung bases. AORTA: No aneurysm or dissection. CELIAC AXIS: Patent. SUPERIOR MESENTERIC ARTERY: Patent. RENAL ARTERIES: Patent. INFERIOR MESENTERIC ARTERY: Patent. ILIAC ARTERIES: Patent. Visualized femoral vasculature:  Patent. LIVER: Normal.     GALLBLADDER: Normal.      BILIARY TREE: Normal.      PANCREAS: Normal.     SPLEEN: Normal.     ADRENAL GLANDS: Normal.     KIDNEYS/URETERS/BLADDER: Negative for acute abnormality. There is a 3 mm   nonobstructing stone in the left renal pelvis, lower pole. RETROPERITONEUM: Normal.    BOWEL/MESENTERY: Focal hypodense wall thickening of the mid descending and   proximal sigmoid colon with surrounding fat stranding. There is no evidence of   contrast extravasation into the lumen of the bowel on arterial or delayed phase   images there is no bowel obstruction, perforation or perienteric abscess. APPENDIX: Identified and normal.   PERITONEAL CAVITY: Normal.     REPRODUCTIVE ORGANS: Normal.     BONE/TISSUES: No acute abnormality. OTHER: None               CXR Results  (Last 48 hours)    None          Medical Decision Making and ED Course   - I am the first and primary provider for this patient AND AM THE PRIMARY PROVIDER OF RECORD. - I reviewed the vital signs, available nursing notes, past medical history, past surgical history, family history and social history. - Initial assessment performed. The patients presenting problems have been discussed, and the staff are in agreement with the care plan formulated and outlined with them. I have encouraged them to ask questions as they arise throughout their visit. Vital Signs-Reviewed the patient's vital signs. Patient Vitals for the past 12 hrs:   Temp Pulse Resp BP SpO2   11/10/21 0828 99.1 °F (37.3 °C) 88 16 (!) 149/78 96 %   11/10/21 0400  88 16 (!) 144/90 99 %   11/10/21 0135 98.4 °F (36.9 °C) 87 18 (!) 158/90 99 %       Records Reviewed: Nursing Notes    ED Course:       ED Course as of 11/10/21 0903   Wed Nov 10, 2021   0502 41 yo F presents as a transfer for GI bleed. Started acutely 1 day ago. Endorses some mild lower abdominal cramping. Never had any like this before. Has never had a colonoscopy. Never been diagnosed with diverticulosis. Frontal diagnosis includes diverticular bleed versus internal hemorrhoids versus brisk upper GI bleed versus symptomatic anemia. Patient is not any blood thinners. Will get labs including CBC, BMP. Patient had a type and screen prehospital that we can see in her system. We will continue to trend her hemoglobin. Getting a CTA to evaluate for diverticular bleed. Anticipate admission. [LW]      ED Course User Index  [LW] Alejandro Jacob MD       Spoke with Dr. Tate Rolle from GI. Patient will require scope. Patient admitted to Dr. Nelly Torrez. Consultations:       Consultations: -  Hospitalist Consultant: Dr. Nelly Torrez: We have asked for emergent assistance with regard to this patient. We have discussed the patients HPI, ROS, PE and results this far. They will come and evaluate the patient for admission. and - Gastroenterology:  Dr. Tate Rolle . We have asked for emergent assistance with regard to this patient. We have discussed the patients HPI, ROS, PE and results this far. They will come and evaluate the patient for their gastrointestinal needs and recommend further treatment with possible admission. Disposition     Disposition: Admitted to Floor Medical Floor the case was discussed with the admitting physician Nelly Torrez. Admitted      Diagnosis     Clinical Impression:   1. Gastrointestinal hemorrhage, unspecified gastrointestinal hemorrhage type        Attestations:    Juanita Ford MD    Please note that this dictation was completed with [employer], the computer voice recognition software. Quite often unanticipated grammatical, syntax, homophones, and other interpretive errors are inadvertently transcribed by the computer software. Please disregard these errors. Please excuse any errors that have escaped final proofreading.   Thank you.

## 2021-11-10 NOTE — ED NOTES
TRANSFER - OUT REPORT:    Verbal report given to donjett on Kailey Hair  being transferred to [facility] for routine progression of care       Report consisted of patients Situation, Background, Assessment and   Recommendations(SBAR). Information from the following report(s) SBAR was reviewed with the receiving nurse. Lines:   Peripheral IV 11/10/21 Left Antecubital (Active)        Opportunity for questions and clarification was provided.       Patient transported with:   Monitor

## 2021-11-10 NOTE — CONSULTS
Gastroenterology Consult     Referring Physician: Teri Hernandez MD     Consult Date: 11/10/2021     Subjective:     Chief Complaint: Rectal bleeding    History of Present Illness: Joanne Oliva is a 42 y.o. female who is seen in consultation for lower GI bleed. Patient was admitted to the hospital from [facility] with complaints of lower abdominal cramping and loose bloody stools. She had several episodes of bloody stools early this morning. She had streaks of blood with hard stools in the past but not like this. No exacerbating factors. Not taking any blood thinners. No nausea or vomiting other than when she is having the abdominal cramping. No fever or chills. Initial hgb is stable, from 14.6, now 13.3. CT scan of abdomen with no evidence of GI hemorrhage. There is however nonspecific colitis affecting the distal portions of the colon. PMH of HTN and HLD. On exam, patient is awake and alert ambulatory.  at bedside. Patient still having loose, bloody stools, went 5x since this morning. Lower abdominal pain/cramping only happens before bowel movement. \"No actual stools just red blood\". Never had a colonoscopy. No history of GI bleeding.      Past Medical History:   Diagnosis Date    Hyperlipidemia     Hypertension      Past Surgical History:   Procedure Laterality Date    HX TUBAL LIGATION        Family History   Problem Relation Age of Onset    Prostate Cancer Father      Social History     Tobacco Use    Smoking status: Never Smoker    Smokeless tobacco: Never Used   Substance Use Topics    Alcohol use: Never      No Known Allergies  Current Facility-Administered Medications   Medication Dose Route Frequency    atorvastatin (LIPITOR) tablet 20 mg  20 mg Oral DAILY    amLODIPine (NORVASC) tablet 5 mg  5 mg Oral DAILY    citalopram (CELEXA) tablet 20 mg  20 mg Oral DAILY    lisinopriL (PRINIVIL, ZESTRIL) tablet 40 mg (Patient Supplied)  40 mg Oral DAILY    levonorgestrel-ethinyl estradiol (SEASONALE) 0.15 mg-30 mcg (91) per tablet 1 Tablet  1 Tablet Oral DAILY    0.9% sodium chloride infusion 1,000 mL  1,000 mL IntraVENous CONTINUOUS    sodium chloride (NS) flush 5-40 mL  5-40 mL IntraVENous Q8H    sodium chloride (NS) flush 5-40 mL  5-40 mL IntraVENous PRN    ondansetron (ZOFRAN ODT) tablet 4 mg  4 mg Oral Q4H PRN        Review of Systems:  A detailed 10 organ review of systems is obtained with pertinent positives as listed in the History of Present Illness and Past Medical History. All others are negative. Objective:     Physical Exam:  Visit Vitals  BP (!) 149/78 (BP Patient Position: Supine)   Pulse 88   Temp 99.1 °F (37.3 °C)   Resp 16   SpO2 96%        Skin:  Extremities and face reveal no rashes. No kumar erythema. No telangiectasias on the chest wall. HEENT: Sclerae anicteric. Extra-occular muscles are intact. No oral ulcers. No abnormal pigmentation of the lips. The neck is supple. Cardiovascular: Regular rate and rhythm. No murmurs, gallops, or rubs. PMI nondisplaced. Carotids without bruits. Respiratory:  Comfortable breathing with no accessory muscle use. Clear breath sounds with no wheezes, rales, or rhonchi. GI:  Abdomen nondistended, soft, and nontender. Normal active bowel sounds. No enlargement of the liver or spleen. No masses palpable. Rectal:  Deferred  Musculoskeletal:  No pitting edema of the lower legs. Extremities have good range of motion. No costovertebral tenderness. Neurological:  Gross memory appears intact. Patient is alert and oriented. Psychiatric:  Mood appears appropriate with judgement intact. Lymphatic:  No cervical or supraclavicular adenopathy.     Lab/Data Review:  Recent Results (from the past 24 hour(s))   URINALYSIS W/ RFLX MICROSCOPIC    Collection Time: 11/09/21 10:45 PM   Result Value Ref Range    Color Yellow/Straw      Appearance Clear Clear      Specific gravity 1.025 1.003 - 1.030      pH (UA) 6.0 5.0 - 8.0      Protein Negative Negative mg/dL    Glucose Negative Negative mg/dL    Ketone Negative Negative mg/dL    Bilirubin Negative Negative      Blood Trace (A) Negative      Urobilinogen 0.2 0.2 - 1.0 EU/dL    Nitrites Negative Negative      Leukocyte Esterase Trace (A) Negative     HCG URINE, QL    Collection Time: 11/09/21 10:45 PM   Result Value Ref Range    HCG urine, QL Negative Negative     URINE MICROSCOPIC    Collection Time: 11/09/21 10:45 PM   Result Value Ref Range    WBC 5-10 0 - 5 /hpf    RBC 5-10 0 - 3 /hpf    Bacteria 2+ (A) Negative /hpf   CBC WITH AUTOMATED DIFF    Collection Time: 11/09/21 10:55 PM   Result Value Ref Range    WBC 11.5 (H) 4.4 - 11.3 K/uL    RBC 4.74 M/uL    HGB 14.6 13.5 - 17.5 g/dL    HCT 42.7 36 - 46 %    MCV 90.2 80 - 100 FL    MCH 30.8 (L) 31 - 34 PG    MCHC 34.2 31.0 - 36.0 g/dL    RDW 11.8 11.5 - 14.5 %    PLATELET 471 K/uL    MPV 7.4 6.5 - 11.5 FL    NEUTROPHILS 70 42 - 75 %    LYMPHOCYTES 26 20.5 - 51.1 %    MONOCYTES 2 1.7 - 9.3 %    EOSINOPHILS 1 0.9 - 2.9 %    BASOPHILS 1 0.0 - 2.5 %    ABS. NEUTROPHILS 8.0 (H) 1.8 - 7.7 K/UL    ABS. LYMPHOCYTES 3.0 1.0 - 4.8 K/UL    ABS. MONOCYTES 0.3 0.0 - 0.8 K/UL    ABS. EOSINOPHILS 0.1 0.0 - 0.7 K/UL    ABS. BASOPHILS 0.1 0.0 - 0.2 K/UL   METABOLIC PANEL, COMPREHENSIVE    Collection Time: 11/09/21 10:55 PM   Result Value Ref Range    Sodium 140 136 - 145 mmol/L    Potassium 3.4 (L) 3.5 - 5.1 mmol/L    Chloride 104 97 - 108 mmol/L    CO2 25 21 - 32 mmol/L    Anion gap 11 5 - 15 mmol/L    Glucose 132 (H) 65 - 100 mg/dL    BUN 19 6 - 20 mg/dL    Creatinine 0.95 0.55 - 1.02 mg/dL    BUN/Creatinine ratio 20 12 - 20      GFR est AA >60 >60 ml/min/1.73m2    GFR est non-AA >60 >60 ml/min/1.73m2    Calcium 9.1 8.5 - 10.1 mg/dL    Bilirubin, total 0.6 0.2 - 1.0 mg/dL    AST (SGOT) 15 15 - 37 U/L    ALT (SGPT) 24 12 - 78 U/L    Alk.  phosphatase 111 45 - 117 U/L    Protein, total 7.4 6.4 - 8.2 g/dL    Albumin 3.4 (L) 3.5 - 5.0 g/dL    Globulin 4.0 2.0 - 4.0 g/dL A-G Ratio 0.9 (L) 1.1 - 2.2     PROTHROMBIN TIME + INR    Collection Time: 11/09/21 10:55 PM   Result Value Ref Range    Prothrombin time 9.9 9.0 - 11.1 sec    INR 1.0 0.9 - 1.1     PTT    Collection Time: 11/09/21 10:55 PM   Result Value Ref Range    aPTT 21.5 (L) 22.1 - 31.0 sec    aPTT, therapeutic range   82 - 109 sec   TYPE & SCREEN    Collection Time: 11/09/21 11:00 PM   Result Value Ref Range    Crossmatch Expiration 11/12/2021,2359     ABO/Rh(D) O Positive     Antibody screen Negative    OCCULT BLOOD, STOOL    Collection Time: 11/09/21 11:00 PM   Result Value Ref Range    Occult Blood,day 1 Positive     CBC WITH AUTOMATED DIFF    Collection Time: 11/10/21  1:46 AM   Result Value Ref Range    WBC 9.7 3.6 - 11.0 K/uL    RBC 4.39 3.80 - 5.20 M/uL    HGB 13.2 11.5 - 16.0 g/dL    HCT 39.0 35.0 - 47.0 %    MCV 88.8 80.0 - 99.0 FL    MCH 30.1 26.0 - 34.0 PG    MCHC 33.8 30.0 - 36.5 g/dL    RDW 12.1 11.5 - 14.5 %    PLATELET 545 174 - 751 K/uL    MPV 9.3 8.9 - 12.9 FL    NRBC 0.0 0.0  WBC    ABSOLUTE NRBC 0.00 0.00 - 0.01 K/uL    NEUTROPHILS 69 32 - 75 %    LYMPHOCYTES 26 12 - 49 %    MONOCYTES 4 (L) 5 - 13 %    EOSINOPHILS 1 0 - 7 %    BASOPHILS 0 0 - 1 %    IMMATURE GRANULOCYTES 0 0 - 0.5 %    ABS. NEUTROPHILS 6.6 1.8 - 8.0 K/UL    ABS. LYMPHOCYTES 2.5 0.8 - 3.5 K/UL    ABS. MONOCYTES 0.4 0.0 - 1.0 K/UL    ABS. EOSINOPHILS 0.1 0.0 - 0.4 K/UL    ABS. BASOPHILS 0.0 0.0 - 0.1 K/UL    ABS. IMM.  GRANS. 0.0 0.00 - 0.04 K/UL    DF AUTOMATED     METABOLIC PANEL, BASIC    Collection Time: 11/10/21  1:46 AM   Result Value Ref Range    Sodium 141 136 - 145 mmol/L    Potassium 3.6 3.5 - 5.1 mmol/L    Chloride 112 (H) 97 - 108 mmol/L    CO2 21 21 - 32 mmol/L    Anion gap 8 5 - 15 mmol/L    Glucose 90 65 - 100 mg/dL    BUN 15 6 - 20 mg/dL    Creatinine 0.59 0.55 - 1.02 mg/dL    BUN/Creatinine ratio 25 (H) 12 - 20      GFR est AA >60 >60 ml/min/1.73m2    GFR est non-AA >60 >60 ml/min/1.73m2    Calcium 8.0 (L) 8.5 - 10.1 mg/dL   TYPE & SCREEN    Collection Time: 11/10/21  9:42 AM   Result Value Ref Range    Crossmatch Expiration 11/13/2021,2359     ABO/Rh(D) O Positive     Antibody screen Negative    TSH 3RD GENERATION    Collection Time: 11/10/21  9:42 AM   Result Value Ref Range    TSH 0.99 0.36 - 3.74 uIU/mL   SED RATE (ESR)    Collection Time: 11/10/21  9:42 AM   Result Value Ref Range    Sed rate, automated 28 mm/hr   C REACTIVE PROTEIN, QT    Collection Time: 11/10/21  9:42 AM   Result Value Ref Range    C-Reactive protein 2.69 (H) 0.00 - 0.60 mg/dL   HGB & HCT    Collection Time: 11/10/21  9:42 AM   Result Value Ref Range    HGB 13.3 11.5 - 16.0 g/dL    HCT 39.2 35.0 - 47.0 %        CTA ABDOMEN PELV W CONT   Final Result   Normal CT angiogram of the abdomen and pelvis. No evidence of GI   hemorrhage. There is however nonspecific colitis affecting the distal portions   of the colon as described. Considerations are for infectious, inflammatory or   even ischemic colitis; ischemic colitis would not be excluded by this exam   although the vasculature as visualized appears intact. Nonobstructing left   nephrolithiasis. Assessment/Plan:   1. Rectal bleeding     -Schedule for colonoscopy in AM. Please get consent. NPO post midnight     -Hgb stable. Monitor and transfuse as needed.     -Start Cipro and Flagyl IV     -CBC in am.  2. Hypertension     -Continue home antihypertensive meds  3. Hyperlipidemia     -continue statins. 4. Anxiety     -Continue Celexa      Active Problems:    Acute lower GI hemorrhage (11/10/2021)         IP CONSULT TO HOSPITALIST  IP CONSULT TO GASTROENTEROLOGY  IP CONSULT TO GASTROENTEROLOGY      Patient discussed with Dr Alex Marie and agrees to above impression and plan.   Thank you for allowing me to participate in this patients care    Tico Cordon FNP-C  Cc Referring Physician   Mansoor Li MD

## 2021-11-10 NOTE — H&P
History and Physical              Subjective :   Chief Complaint : Bleeding per rectum since morning    Source of information : Patient, ED provider notes. History of present illness:   42 y.o. female with history of hypertension and hyperlipidemia presents to the emergency room at the referring facility complaining of blood in her stool started this morning. States that developed some cramping in the lower abdomen, then passing the blood. Initially had loose bloody stools, sometimes it is kaleigh blood, states several episodes since morning. Feels little nauseous when she is having this cramping started but otherwise denies any nausea or vomiting. No fever or chills. No previous history. No exacerbating relieving factors. CTA done at the referring facility no obvious evidence of GI hemorrhage, but found with distal colitis. Hemoglobin is stable and patient is hemodynamically stable. Past Medical History:   Diagnosis Date    Hyperlipidemia     Hypertension      Past Surgical History:   Procedure Laterality Date    HX TUBAL LIGATION       Family History   Problem Relation Age of Onset    Prostate Cancer Father       Social History     Tobacco Use    Smoking status: Never Smoker    Smokeless tobacco: Never Used   Substance Use Topics    Alcohol use: Never       Prior to Admission medications    Medication Sig Start Date End Date Taking? Authorizing Provider   Jolessa 0.15 mg-30 mcg (91) 3MPk Take 1 Tablet by mouth daily. 10/1/21   OtherColeen MD   atorvastatin (LIPITOR) 20 mg tablet Take 20 mg by mouth daily. 9/15/21   Coleen Natarajan MD   amLODIPine (NORVASC) 5 mg tablet Take 5 mg by mouth daily. 9/15/21   Coleen Natarajan MD   benazepriL (LOTENSIN) 40 mg tablet Take 40 mg by mouth daily. 9/15/21   Coleen Natarajan MD   citalopram (CELEXA) 20 mg tablet Take 20 mg by mouth daily.  9/15/21   Coleen Natarajan MD     No Known Allergies          Review of Systems:  Constitutional: Appetite is good, denies weight loss, no fever, no chills, no night sweats. Eye: No recent visual disturbances, no discharge, no double vision. Ear/nose/mouth/throat : No hearing disturbance, no ear pain, no nasal congestion, no sore throat, no trouble swallowing. Respiratory : No trouble breathing, no cough, no shortness of breath, no hemoptysis, no wheezing. Cardiovascular : No chest pain, no palpitation, no racing of heart, no orthopnea, no paroxysmal nocturnal dyspnea, no peripheral edema. Gastrointestinal : As in history of present illness. Genitourinary : No dysuria, no hematuria, no increased frequency. Regular menstrual cycles. .  Lymphatics : No swollen glands -Neck, axillary, inguinal.  Endocrine : No excessive thirst, No polyuria. Immunologic : No hives, urticaria, No seasonal allergies. Musculoskeletal : No joint swelling, No pain, No effusion,  No back pain, No neck pain. Integumentary : No rash, No pruritus, No ecchymosis. Hematology : No petechiae, No easy bruising,  No tendency to bleed easy. Neurology : Denies change in mental status, No abnormal balance, No headache, No confusion, No numbness or tingling. Psychiatric : No mood swings, No anxiety, No depression. Vitals:     Patient Vitals for the past 12 hrs:   Temp Pulse Resp BP SpO2   11/10/21 0400  88 16 (!) 144/90 99 %   11/10/21 0135 98.4 °F (36.9 °C) 87 18 (!) 158/90 99 %       Physical Exam:   General : Well-built, looks anxious but comfortable with no respiratory distress  HEENT : PERRLA, normal oral mucosa, atraumatic normocephalic, Normal ear and nose. Neck : Supple, no JVD, no masses noted, no carotid bruit. Lungs : Breath sounds with good air entry bilaterally, no wheezes or rales, no accessory muscle use. CVS : Rhythm rate regular, S1+, S2+, no murmur or gallop. Abdomen : Soft, nontender, no organomegaly, bowel sounds active. Extremities : No edema noted,  pedal pulses palpable.   Musculoskeletal : Fair range of motion, no joint swelling or effusion, muscle tone and power appears normal.   Skin : Moist, warm, s no pathological rash. Lymphatic : No cervical lymphadenopathy. Neurological : Awake, alert, oriented to time place person. No neurological deficits. Psychiatric : Mood and affect appears appropriate to the situation. Data Review:   Recent Results (from the past 24 hour(s))   URINALYSIS W/ RFLX MICROSCOPIC    Collection Time: 11/09/21 10:45 PM   Result Value Ref Range    Color Yellow/Straw      Appearance Clear Clear      Specific gravity 1.025 1.003 - 1.030      pH (UA) 6.0 5.0 - 8.0      Protein Negative Negative mg/dL    Glucose Negative Negative mg/dL    Ketone Negative Negative mg/dL    Bilirubin Negative Negative      Blood Trace (A) Negative      Urobilinogen 0.2 0.2 - 1.0 EU/dL    Nitrites Negative Negative      Leukocyte Esterase Trace (A) Negative     HCG URINE, QL    Collection Time: 11/09/21 10:45 PM   Result Value Ref Range    HCG urine, QL Negative Negative     URINE MICROSCOPIC    Collection Time: 11/09/21 10:45 PM   Result Value Ref Range    WBC 5-10 0 - 5 /hpf    RBC 5-10 0 - 3 /hpf    Bacteria 2+ (A) Negative /hpf   CBC WITH AUTOMATED DIFF    Collection Time: 11/09/21 10:55 PM   Result Value Ref Range    WBC 11.5 (H) 4.4 - 11.3 K/uL    RBC 4.74 M/uL    HGB 14.6 13.5 - 17.5 g/dL    HCT 42.7 36 - 46 %    MCV 90.2 80 - 100 FL    MCH 30.8 (L) 31 - 34 PG    MCHC 34.2 31.0 - 36.0 g/dL    RDW 11.8 11.5 - 14.5 %    PLATELET 621 K/uL    MPV 7.4 6.5 - 11.5 FL    NEUTROPHILS 70 42 - 75 %    LYMPHOCYTES 26 20.5 - 51.1 %    MONOCYTES 2 1.7 - 9.3 %    EOSINOPHILS 1 0.9 - 2.9 %    BASOPHILS 1 0.0 - 2.5 %    ABS. NEUTROPHILS 8.0 (H) 1.8 - 7.7 K/UL    ABS. LYMPHOCYTES 3.0 1.0 - 4.8 K/UL    ABS. MONOCYTES 0.3 0.0 - 0.8 K/UL    ABS. EOSINOPHILS 0.1 0.0 - 0.7 K/UL    ABS.  BASOPHILS 0.1 0.0 - 0.2 K/UL   METABOLIC PANEL, COMPREHENSIVE    Collection Time: 11/09/21 10:55 PM   Result Value Ref Range    Sodium 140 136 - 145 mmol/L    Potassium 3.4 (L) 3.5 - 5.1 mmol/L    Chloride 104 97 - 108 mmol/L    CO2 25 21 - 32 mmol/L    Anion gap 11 5 - 15 mmol/L    Glucose 132 (H) 65 - 100 mg/dL    BUN 19 6 - 20 mg/dL    Creatinine 0.95 0.55 - 1.02 mg/dL    BUN/Creatinine ratio 20 12 - 20      GFR est AA >60 >60 ml/min/1.73m2    GFR est non-AA >60 >60 ml/min/1.73m2    Calcium 9.1 8.5 - 10.1 mg/dL    Bilirubin, total 0.6 0.2 - 1.0 mg/dL    AST (SGOT) 15 15 - 37 U/L    ALT (SGPT) 24 12 - 78 U/L    Alk. phosphatase 111 45 - 117 U/L    Protein, total 7.4 6.4 - 8.2 g/dL    Albumin 3.4 (L) 3.5 - 5.0 g/dL    Globulin 4.0 2.0 - 4.0 g/dL    A-G Ratio 0.9 (L) 1.1 - 2.2     PROTHROMBIN TIME + INR    Collection Time: 11/09/21 10:55 PM   Result Value Ref Range    Prothrombin time 9.9 9.0 - 11.1 sec    INR 1.0 0.9 - 1.1     PTT    Collection Time: 11/09/21 10:55 PM   Result Value Ref Range    aPTT 21.5 (L) 22.1 - 31.0 sec    aPTT, therapeutic range   82 - 109 sec   TYPE & SCREEN    Collection Time: 11/09/21 11:00 PM   Result Value Ref Range    Crossmatch Expiration 11/12/2021,2359     ABO/Rh(D) O Positive     Antibody screen Negative    OCCULT BLOOD, STOOL    Collection Time: 11/09/21 11:00 PM   Result Value Ref Range    Occult Blood,day 1 Positive     CBC WITH AUTOMATED DIFF    Collection Time: 11/10/21  1:46 AM   Result Value Ref Range    WBC 9.7 3.6 - 11.0 K/uL    RBC 4.39 3.80 - 5.20 M/uL    HGB 13.2 11.5 - 16.0 g/dL    HCT 39.0 35.0 - 47.0 %    MCV 88.8 80.0 - 99.0 FL    MCH 30.1 26.0 - 34.0 PG    MCHC 33.8 30.0 - 36.5 g/dL    RDW 12.1 11.5 - 14.5 %    PLATELET 238 922 - 227 K/uL    MPV 9.3 8.9 - 12.9 FL    NRBC 0.0 0.0  WBC    ABSOLUTE NRBC 0.00 0.00 - 0.01 K/uL    NEUTROPHILS 69 32 - 75 %    LYMPHOCYTES 26 12 - 49 %    MONOCYTES 4 (L) 5 - 13 %    EOSINOPHILS 1 0 - 7 %    BASOPHILS 0 0 - 1 %    IMMATURE GRANULOCYTES 0 0 - 0.5 %    ABS. NEUTROPHILS 6.6 1.8 - 8.0 K/UL    ABS. LYMPHOCYTES 2.5 0.8 - 3.5 K/UL    ABS. MONOCYTES 0.4 0.0 - 1.0 K/UL    ABS.  EOSINOPHILS 0.1 0.0 - 0.4 K/UL    ABS. BASOPHILS 0.0 0.0 - 0.1 K/UL    ABS. IMM. GRANS. 0.0 0.00 - 0.04 K/UL    DF AUTOMATED     METABOLIC PANEL, BASIC    Collection Time: 11/10/21  1:46 AM   Result Value Ref Range    Sodium 141 136 - 145 mmol/L    Potassium 3.6 3.5 - 5.1 mmol/L    Chloride 112 (H) 97 - 108 mmol/L    CO2 21 21 - 32 mmol/L    Anion gap 8 5 - 15 mmol/L    Glucose 90 65 - 100 mg/dL    BUN 15 6 - 20 mg/dL    Creatinine 0.59 0.55 - 1.02 mg/dL    BUN/Creatinine ratio 25 (H) 12 - 20      GFR est AA >60 >60 ml/min/1.73m2    GFR est non-AA >60 >60 ml/min/1.73m2    Calcium 8.0 (L) 8.5 - 10.1 mg/dL       Radiologic Studies :     CTA abdomen/pelvis : IMPRESSION  Normal CT angiogram of the abdomen and pelvis. No evidence of GI  hemorrhage. There is however nonspecific colitis affecting the distal portions  of the colon as described. Considerations are for infectious, inflammatory or  even ischemic colitis; ischemic colitis would not be excluded by this exam  although the vasculature as visualized appears intact. Nonobstructing left  nephrolithiasis. Assessment and Plan :     Acute lower GI hemorrhage: Etiology most likely from colitis. Already contacted gastroenterologist with emergency room provider, we will order H&H every 8 hours, follow-up with recommendations from gastroenterology    Benign essential hypertension: Uncontrolled likely from stress. We will continue home medications with no change    Hyperlipidemia: On statin which we will continue    Anxiety: On Celexa which we will continue    Admitted for observation, in case if she does not get better we will change to inpatient. Home medications reviewed and verified, has no advanced medical directives. CC : Rae Ramirez MD  Signed By: Manoj Dickey MD     November 10, 2021      This dictation was done by dragon, computer voice recognition software.   Often unanticipated grammatical, syntax, [place] phones and other interpretive errors are inadvertently transcribed. Please excuse errors that have escaped final proofreading.

## 2021-11-11 ENCOUNTER — APPOINTMENT (OUTPATIENT)
Dept: ENDOSCOPY | Age: 41
End: 2021-11-11
Attending: INTERNAL MEDICINE
Payer: COMMERCIAL

## 2021-11-11 LAB
ALBUMIN SERPL-MCNC: 3 G/DL (ref 3.5–5)
ANION GAP SERPL CALC-SCNC: 6 MMOL/L (ref 5–15)
BASOPHILS # BLD: 0 K/UL (ref 0–0.1)
BASOPHILS NFR BLD: 0 % (ref 0–1)
BUN SERPL-MCNC: 7 MG/DL (ref 6–20)
BUN/CREAT SERPL: 11 (ref 12–20)
CA-I BLD-MCNC: 8.4 MG/DL (ref 8.5–10.1)
CHLORIDE SERPL-SCNC: 110 MMOL/L (ref 97–108)
CO2 SERPL-SCNC: 24 MMOL/L (ref 21–32)
CREAT SERPL-MCNC: 0.62 MG/DL (ref 0.55–1.02)
DIFFERENTIAL METHOD BLD: NORMAL
EOSINOPHIL # BLD: 0.1 K/UL (ref 0–0.4)
EOSINOPHIL NFR BLD: 2 % (ref 0–7)
ERYTHROCYTE [DISTWIDTH] IN BLOOD BY AUTOMATED COUNT: 12.1 % (ref 11.5–14.5)
GLUCOSE SERPL-MCNC: 113 MG/DL (ref 65–100)
HCT VFR BLD AUTO: 37.1 % (ref 35–47)
HGB BLD-MCNC: 12.8 G/DL (ref 11.5–16)
IMM GRANULOCYTES # BLD AUTO: 0 K/UL (ref 0–0.04)
IMM GRANULOCYTES NFR BLD AUTO: 0 % (ref 0–0.5)
LYMPHOCYTES # BLD: 1.9 K/UL (ref 0.8–3.5)
LYMPHOCYTES NFR BLD: 21 % (ref 12–49)
MCH RBC QN AUTO: 29.8 PG (ref 26–34)
MCHC RBC AUTO-ENTMCNC: 34.5 G/DL (ref 30–36.5)
MCV RBC AUTO: 86.5 FL (ref 80–99)
MONOCYTES # BLD: 0.5 K/UL (ref 0–1)
MONOCYTES NFR BLD: 6 % (ref 5–13)
NEUTS SEG # BLD: 6.7 K/UL (ref 1.8–8)
NEUTS SEG NFR BLD: 71 % (ref 32–75)
NRBC # BLD: 0 K/UL (ref 0–0.01)
NRBC BLD-RTO: 0 PER 100 WBC
PHOSPHATE SERPL-MCNC: 1.1 MG/DL (ref 2.6–4.7)
PLATELET # BLD AUTO: 255 K/UL (ref 150–400)
PMV BLD AUTO: 9.3 FL (ref 8.9–12.9)
POTASSIUM SERPL-SCNC: 3.5 MMOL/L (ref 3.5–5.1)
RBC # BLD AUTO: 4.29 M/UL (ref 3.8–5.2)
SODIUM SERPL-SCNC: 140 MMOL/L (ref 136–145)
WBC # BLD AUTO: 9.2 K/UL (ref 3.6–11)

## 2021-11-11 PROCEDURE — 74011250637 HC RX REV CODE- 250/637: Performed by: HOSPITALIST

## 2021-11-11 PROCEDURE — 88305 TISSUE EXAM BY PATHOLOGIST: CPT

## 2021-11-11 PROCEDURE — G0378 HOSPITAL OBSERVATION PER HR: HCPCS

## 2021-11-11 PROCEDURE — 74011250637 HC RX REV CODE- 250/637: Performed by: NURSE PRACTITIONER

## 2021-11-11 PROCEDURE — 74011250636 HC RX REV CODE- 250/636: Performed by: HOSPITALIST

## 2021-11-11 PROCEDURE — 77030019988 HC FCPS ENDOSC DISP BSC -B: Performed by: INTERNAL MEDICINE

## 2021-11-11 PROCEDURE — 85025 COMPLETE CBC W/AUTO DIFF WBC: CPT

## 2021-11-11 PROCEDURE — 36415 COLL VENOUS BLD VENIPUNCTURE: CPT

## 2021-11-11 PROCEDURE — 74011250636 HC RX REV CODE- 250/636: Performed by: NURSE PRACTITIONER

## 2021-11-11 PROCEDURE — 74011250636 HC RX REV CODE- 250/636: Performed by: INTERNAL MEDICINE

## 2021-11-11 PROCEDURE — 2709999900 HC NON-CHARGEABLE SUPPLY: Performed by: INTERNAL MEDICINE

## 2021-11-11 PROCEDURE — 96375 TX/PRO/DX INJ NEW DRUG ADDON: CPT

## 2021-11-11 PROCEDURE — 76040000007: Performed by: INTERNAL MEDICINE

## 2021-11-11 PROCEDURE — 96376 TX/PRO/DX INJ SAME DRUG ADON: CPT

## 2021-11-11 PROCEDURE — 74011000250 HC RX REV CODE- 250: Performed by: HOSPITALIST

## 2021-11-11 PROCEDURE — 76060000032 HC ANESTHESIA 0.5 TO 1 HR: Performed by: INTERNAL MEDICINE

## 2021-11-11 PROCEDURE — 80069 RENAL FUNCTION PANEL: CPT

## 2021-11-11 RX ORDER — MIDAZOLAM HYDROCHLORIDE 1 MG/ML
INJECTION, SOLUTION INTRAMUSCULAR; INTRAVENOUS
Status: DISPENSED
Start: 2021-11-11 | End: 2021-11-11

## 2021-11-11 RX ORDER — MIDAZOLAM HYDROCHLORIDE 1 MG/ML
INJECTION INTRAMUSCULAR; INTRAVENOUS AS NEEDED
Status: DISCONTINUED | OUTPATIENT
Start: 2021-11-11 | End: 2021-11-12 | Stop reason: HOSPADM

## 2021-11-11 RX ORDER — FENTANYL CITRATE 50 UG/ML
INJECTION, SOLUTION INTRAMUSCULAR; INTRAVENOUS
Status: DISPENSED
Start: 2021-11-11 | End: 2021-11-11

## 2021-11-11 RX ORDER — FENTANYL CITRATE 50 UG/ML
INJECTION, SOLUTION INTRAMUSCULAR; INTRAVENOUS AS NEEDED
Status: DISCONTINUED | OUTPATIENT
Start: 2021-11-11 | End: 2021-11-12 | Stop reason: HOSPADM

## 2021-11-11 RX ADMIN — METRONIDAZOLE 500 MG: 500 TABLET ORAL at 22:36

## 2021-11-11 RX ADMIN — METRONIDAZOLE 500 MG: 500 TABLET ORAL at 12:25

## 2021-11-11 RX ADMIN — CIPROFLOXACIN 400 MG: 2 INJECTION, SOLUTION INTRAVENOUS at 17:08

## 2021-11-11 RX ADMIN — ACETAMINOPHEN 650 MG: 325 TABLET ORAL at 06:25

## 2021-11-11 RX ADMIN — CEFTRIAXONE 1 G: 1 INJECTION, POWDER, FOR SOLUTION INTRAMUSCULAR; INTRAVENOUS at 22:36

## 2021-11-11 RX ADMIN — CIPROFLOXACIN 400 MG: 2 INJECTION, SOLUTION INTRAVENOUS at 04:22

## 2021-11-11 RX ADMIN — CITALOPRAM HYDROBROMIDE 20 MG: 20 TABLET ORAL at 12:24

## 2021-11-11 RX ADMIN — AMLODIPINE BESYLATE 5 MG: 5 TABLET ORAL at 12:25

## 2021-11-11 RX ADMIN — METRONIDAZOLE 500 MG: 500 TABLET ORAL at 17:08

## 2021-11-11 RX ADMIN — ATORVASTATIN CALCIUM 20 MG: 20 TABLET, FILM COATED ORAL at 12:25

## 2021-11-11 NOTE — PROGRESS NOTES
Hospitalist Progress Note    Subjective:   Daily Progress Note: 2021 8:21 AM    Patient seen and examined in room. Patient feeling better this morning. Did have a small, loose bowel movement this morning with specks of BRB. Complains of left abdominal tenderness and cramping. Denies N/V. Colonoscopy scheduled this morning. Current Facility-Administered Medications   Medication Dose Route Frequency    atorvastatin (LIPITOR) tablet 20 mg  20 mg Oral DAILY    amLODIPine (NORVASC) tablet 5 mg  5 mg Oral DAILY    citalopram (CELEXA) tablet 20 mg  20 mg Oral DAILY    lisinopriL (PRINIVIL, ZESTRIL) tablet 40 mg (Patient Supplied)  40 mg Oral DAILY    levonorgestrel-ethinyl estradiol (SEASONALE) 0.15 mg-30 mcg (91) per tablet 1 Tablet  1 Tablet Oral DAILY    0.9% sodium chloride infusion 1,000 mL  1,000 mL IntraVENous CONTINUOUS    ondansetron (ZOFRAN ODT) tablet 4 mg  4 mg Oral Q4H PRN    influenza vaccine  (6 mos+)(PF) (FLUARIX/FLULAVAL/FLUZONE QUAD) injection 0.5 mL  1 Each IntraMUSCular PRIOR TO DISCHARGE    metroNIDAZOLE (FLAGYL) tablet 500 mg  500 mg Oral TID    ciprofloxacin (CIPRO) 400 mg in D5W IVPB (premix)  400 mg IntraVENous Q12H    acetaminophen (TYLENOL) tablet 650 mg  650 mg Oral Q6H PRN        Review of Systems  Review of Systems   Constitutional: Negative. HENT: Negative. Eyes: Negative. Respiratory: Negative. Cardiovascular: Negative. Gastrointestinal: Positive for abdominal pain (left side pain and tenderness), blood in stool and diarrhea. Genitourinary: Negative. Musculoskeletal: Negative. Skin: Negative. Neurological: Negative. Psychiatric/Behavioral: Negative. Objective:     Visit Vitals  /86   Pulse 86   Temp 98.5 °F (36.9 °C)   Resp 18   SpO2 98%      O2 Device: None (Room air)    Temp (24hrs), Av.5 °F (36.9 °C), Min:98 °F (36.7 °C), Max:99.1 °F (37.3 °C)      No intake/output data recorded.    1901 -  0700  In: 935 [I.V.:935]  Out: -     Recent Results (from the past 24 hour(s))   TYPE & SCREEN    Collection Time: 11/10/21  9:42 AM   Result Value Ref Range    Crossmatch Expiration 11/13/2021,2359     ABO/Rh(D) Gelacio Maple Positive     Antibody screen Negative    TSH 3RD GENERATION    Collection Time: 11/10/21  9:42 AM   Result Value Ref Range    TSH 0.99 0.36 - 3.74 uIU/mL   SED RATE (ESR)    Collection Time: 11/10/21  9:42 AM   Result Value Ref Range    Sed rate, automated 28 mm/hr   C REACTIVE PROTEIN, QT    Collection Time: 11/10/21  9:42 AM   Result Value Ref Range    C-Reactive protein 2.69 (H) 0.00 - 0.60 mg/dL   HGB & HCT    Collection Time: 11/10/21  9:42 AM   Result Value Ref Range    HGB 13.3 11.5 - 16.0 g/dL    HCT 39.2 35.0 - 47.0 %   HGB & HCT    Collection Time: 11/10/21  6:45 PM   Result Value Ref Range    HGB 12.9 11.5 - 16.0 g/dL    HCT 37.9 35.0 - 47.0 %   RENAL FUNCTION PANEL    Collection Time: 11/11/21  6:26 AM   Result Value Ref Range    Sodium 140 136 - 145 mmol/L    Potassium 3.5 3.5 - 5.1 mmol/L    Chloride 110 (H) 97 - 108 mmol/L    CO2 24 21 - 32 mmol/L    Anion gap 6 5 - 15 mmol/L    Glucose 113 (H) 65 - 100 mg/dL    BUN 7 6 - 20 mg/dL    Creatinine 0.62 0.55 - 1.02 mg/dL    BUN/Creatinine ratio 11 (L) 12 - 20      GFR est AA >60 >60 ml/min/1.73m2    GFR est non-AA >60 >60 ml/min/1.73m2    Calcium 8.4 (L) 8.5 - 10.1 mg/dL    Phosphorus 1.1 (L) 2.6 - 4.7 mg/dL    Albumin 3.0 (L) 3.5 - 5.0 g/dL   CBC WITH AUTOMATED DIFF    Collection Time: 11/11/21  6:26 AM   Result Value Ref Range    WBC 9.2 3.6 - 11.0 K/uL    RBC 4.29 3.80 - 5.20 M/uL    HGB 12.8 11.5 - 16.0 g/dL    HCT 37.1 35.0 - 47.0 %    MCV 86.5 80.0 - 99.0 FL    MCH 29.8 26.0 - 34.0 PG    MCHC 34.5 30.0 - 36.5 g/dL    RDW 12.1 11.5 - 14.5 %    PLATELET 407 010 - 378 K/uL    MPV 9.3 8.9 - 12.9 FL    NRBC 0.0 0.0  WBC    ABSOLUTE NRBC 0.00 0.00 - 0.01 K/uL    NEUTROPHILS 71 32 - 75 %    LYMPHOCYTES 21 12 - 49 %    MONOCYTES 6 5 - 13 % EOSINOPHILS 2 0 - 7 %    BASOPHILS 0 0 - 1 %    IMMATURE GRANULOCYTES 0 0 - 0.5 %    ABS. NEUTROPHILS 6.7 1.8 - 8.0 K/UL    ABS. LYMPHOCYTES 1.9 0.8 - 3.5 K/UL    ABS. MONOCYTES 0.5 0.0 - 1.0 K/UL    ABS. EOSINOPHILS 0.1 0.0 - 0.4 K/UL    ABS. BASOPHILS 0.0 0.0 - 0.1 K/UL    ABS. IMM. GRANS. 0.0 0.00 - 0.04 K/UL    DF AUTOMATED          CTA ABDOMEN PELV W CONT   Final Result   Normal CT angiogram of the abdomen and pelvis. No evidence of GI   hemorrhage. There is however nonspecific colitis affecting the distal portions   of the colon as described. Considerations are for infectious, inflammatory or   even ischemic colitis; ischemic colitis would not be excluded by this exam   although the vasculature as visualized appears intact. Nonobstructing left   nephrolithiasis. PHYSICAL EXAM:    Physical Exam  Constitutional:       Appearance: Normal appearance. She is obese. HENT:      Head: Normocephalic and atraumatic. Nose: Nose normal.   Eyes:      Conjunctiva/sclera: Conjunctivae normal.   Cardiovascular:      Rate and Rhythm: Normal rate and regular rhythm. Pulses: Normal pulses. Heart sounds: Normal heart sounds. Pulmonary:      Effort: Pulmonary effort is normal.      Breath sounds: Normal breath sounds. Abdominal:      General: Bowel sounds are normal.      Palpations: Abdomen is soft. Tenderness: There is abdominal tenderness (left side). Musculoskeletal:         General: Normal range of motion. Cervical back: Normal range of motion. Skin:     General: Skin is warm and dry. Neurological:      General: No focal deficit present. Mental Status: She is alert and oriented to person, place, and time.    Psychiatric:         Mood and Affect: Mood normal.          Data Review    Recent Results (from the past 24 hour(s))   TYPE & SCREEN    Collection Time: 11/10/21  9:42 AM   Result Value Ref Range    Crossmatch Expiration 11/13/2021,2359     ABO/Rh(D) Lisette Mosleys Positive Antibody screen Negative    TSH 3RD GENERATION    Collection Time: 11/10/21  9:42 AM   Result Value Ref Range    TSH 0.99 0.36 - 3.74 uIU/mL   SED RATE (ESR)    Collection Time: 11/10/21  9:42 AM   Result Value Ref Range    Sed rate, automated 28 mm/hr   C REACTIVE PROTEIN, QT    Collection Time: 11/10/21  9:42 AM   Result Value Ref Range    C-Reactive protein 2.69 (H) 0.00 - 0.60 mg/dL   HGB & HCT    Collection Time: 11/10/21  9:42 AM   Result Value Ref Range    HGB 13.3 11.5 - 16.0 g/dL    HCT 39.2 35.0 - 47.0 %   HGB & HCT    Collection Time: 11/10/21  6:45 PM   Result Value Ref Range    HGB 12.9 11.5 - 16.0 g/dL    HCT 37.9 35.0 - 47.0 %   RENAL FUNCTION PANEL    Collection Time: 11/11/21  6:26 AM   Result Value Ref Range    Sodium 140 136 - 145 mmol/L    Potassium 3.5 3.5 - 5.1 mmol/L    Chloride 110 (H) 97 - 108 mmol/L    CO2 24 21 - 32 mmol/L    Anion gap 6 5 - 15 mmol/L    Glucose 113 (H) 65 - 100 mg/dL    BUN 7 6 - 20 mg/dL    Creatinine 0.62 0.55 - 1.02 mg/dL    BUN/Creatinine ratio 11 (L) 12 - 20      GFR est AA >60 >60 ml/min/1.73m2    GFR est non-AA >60 >60 ml/min/1.73m2    Calcium 8.4 (L) 8.5 - 10.1 mg/dL    Phosphorus 1.1 (L) 2.6 - 4.7 mg/dL    Albumin 3.0 (L) 3.5 - 5.0 g/dL   CBC WITH AUTOMATED DIFF    Collection Time: 11/11/21  6:26 AM   Result Value Ref Range    WBC 9.2 3.6 - 11.0 K/uL    RBC 4.29 3.80 - 5.20 M/uL    HGB 12.8 11.5 - 16.0 g/dL    HCT 37.1 35.0 - 47.0 %    MCV 86.5 80.0 - 99.0 FL    MCH 29.8 26.0 - 34.0 PG    MCHC 34.5 30.0 - 36.5 g/dL    RDW 12.1 11.5 - 14.5 %    PLATELET 479 925 - 000 K/uL    MPV 9.3 8.9 - 12.9 FL    NRBC 0.0 0.0  WBC    ABSOLUTE NRBC 0.00 0.00 - 0.01 K/uL    NEUTROPHILS 71 32 - 75 %    LYMPHOCYTES 21 12 - 49 %    MONOCYTES 6 5 - 13 %    EOSINOPHILS 2 0 - 7 %    BASOPHILS 0 0 - 1 %    IMMATURE GRANULOCYTES 0 0 - 0.5 %    ABS. NEUTROPHILS 6.7 1.8 - 8.0 K/UL    ABS. LYMPHOCYTES 1.9 0.8 - 3.5 K/UL    ABS. MONOCYTES 0.5 0.0 - 1.0 K/UL    ABS.  EOSINOPHILS 0.1 0.0 - 0.4 K/UL    ABS. BASOPHILS 0.0 0.0 - 0.1 K/UL    ABS. IMM. GRANS. 0.0 0.00 - 0.04 K/UL    DF AUTOMATED          Assessment/Plan:     Active Problems:    Acute lower GI hemorrhage (11/10/2021)      Ms. Sofya Otto is a 43 yo female with a pmh of HTN and hyperlipidemia who presented with a abdominal pain, diarrhea and bright red blood in her stool. Positive fecal occult on 11/9/21. Elevated CRP. CT abdomen/pelvis shows nonspecific colitis/wall thickening of the mid descending and proximal sigmoid colon with surrounding fat stranding. No evidence of GI hemorrhage. Ischemic colitis could not be excluded. There is also a 3 mm nonobstructing left nephrolithiasis. GI following with colonoscopy pending. Patient started on IV Cipro and Flagyl. 1. Acute Colitis  Positive stool occult  IV cipro and flagyl   Colonoscopy pending   Elevated CRP   CT abdomen/ Pelvis  shows nonspecific colitis with wall thickening of the mid descending and proximal sigmoid colon with surrounding fat stranding. No evidence of GI hemorrhage. Ischemic colitis could not be excluded. There is also a 3 mm nonobstructing left nephrolithiasis. Suspect this is infectious related  Hemoglobin 12.8 and stable    2. HTN  Continue amlodipine  Continue lisinopril   Consider increasing blood pressure medications pending repeat blood pressure check    3. Hyperlipidemia   Continue atorvastatin     4. Depression  Continue Celexa     5. Nephrolithiasis   3mm nonobstructing   Stable     CODE status: Full   Ulcer prophylaxis: Protonix  DVT prophylaxis: SCDs    Discharge barriers: Colonoscopy, repeat hemoglobin    Care Plan discussed with: Patient/Family and Nurse    Total time spent with patient: >35 minutes.

## 2021-11-11 NOTE — PROGRESS NOTES
Admitted earlier today secondary to hematochezia, colitis. Had scant rectal bleed here again this am.  + tenesmus. No prior hx,  No recent abx. Gi appreciated. Abticipate coloniscopy am.  Following hgb and remains relatively stable.

## 2021-11-11 NOTE — PROGRESS NOTES
Problem: Upper and Lower GI Bleed: Day 1  Goal: Activity/Safety  Outcome: Progressing Towards Goal  Goal: Nutrition/Diet  Outcome: Progressing Towards Goal  Goal: Treatments/Interventions/Procedures  Outcome: Progressing Towards Goal  Goal: *Optimal pain control at patient's stated goal  Outcome: Progressing Towards Goal

## 2021-11-11 NOTE — PROGRESS NOTES
Off unit to Endoscopy for Procedure. Left in no acute distress. IV intact with fluids infusing. @ 1215 return to unit in no acute distress. Received report from PACU. New orders placed, nurse to implement.

## 2021-11-11 NOTE — PROGRESS NOTES
Progress Note    Patient: Medina Bledsoe MRN: [id number]  SSN: [id number]    YOB: 1980  Age: 42 y.o. Sex: female      Admit Date: 11/10/2021    LOS: 0 days     Subjective:   GI is following for GI bleed    11/11 colonoscopy - Noninfective gastroenteritis and colitis, unspecified. Repeat colonoscopy in 3 months. Patient seen in room, awake and alert.  at bedside. Denies any further bleeding since last night. Hgb stable 12.8. Denies nausea or vomiting. Still with lower abdominal pain but better. She had a regular meal after colonoscopy and she tolerated that well.      11/10 GI consult note:   Medina Bledsoe is a 42 y.o. female who is seen in consultation for lower GI bleed. Patient was admitted to the hospital from [facility] with complaints of lower abdominal cramping and loose bloody stools. She had several episodes of bloody stools early this morning. She had streaks of blood with hard stools in the past but not like this. No exacerbating factors. Not taking any blood thinners. No nausea or vomiting other than when she is having the abdominal cramping. No fever or chills. Initial hgb is stable, from 14.6, now 13.3. CT scan of abdomen with no evidence of GI hemorrhage. There is however nonspecific colitis affecting the distal portions of the colon. PMH of HTN and HLD. Objective:     Vitals:    11/11/21 1151 11/11/21 1155 11/11/21 1200 11/11/21 1203   BP: 136/66 139/84 136/87 138/80   Pulse:  94 86 85   Resp:  18 18 18   Temp:       SpO2:  96% 96% 96%        Intake and Output:  Current Shift: No intake/output data recorded. Last three shifts: 11/09 1901 - 11/11 0700  In: 935 [I.V.:935]  Out: -     Physical Exam:   Skin:  Extremities and face reveal no rashes. No kumar erythema. HEENT: Sclerae anicteric. Extra-occular muscles are intact. No abnormal pigmentation of the lips. The neck is supple. Cardiovascular: Regular rate and rhythm.   Respiratory:  Comfortable breathing with no accessory muscle use. GI:  Abdomen nondistended, soft, and nontender. No enlargement of the liver or spleen. No masses palpable. Rectal:  Deferred  Musculoskeletal: Extremities have good range of motion. Neurological:  Gross memory appears intact. Patient is alert and oriented. Psychiatric:  Mood appears appropriate with judgement intact. Lymphatic:  No visible adenopathy      Lab/Data Review:  Recent Results (from the past 24 hour(s))   HGB & HCT    Collection Time: 11/10/21  6:45 PM   Result Value Ref Range    HGB 12.9 11.5 - 16.0 g/dL    HCT 37.9 35.0 - 47.0 %   RENAL FUNCTION PANEL    Collection Time: 11/11/21  6:26 AM   Result Value Ref Range    Sodium 140 136 - 145 mmol/L    Potassium 3.5 3.5 - 5.1 mmol/L    Chloride 110 (H) 97 - 108 mmol/L    CO2 24 21 - 32 mmol/L    Anion gap 6 5 - 15 mmol/L    Glucose 113 (H) 65 - 100 mg/dL    BUN 7 6 - 20 mg/dL    Creatinine 0.62 0.55 - 1.02 mg/dL    BUN/Creatinine ratio 11 (L) 12 - 20      GFR est AA >60 >60 ml/min/1.73m2    GFR est non-AA >60 >60 ml/min/1.73m2    Calcium 8.4 (L) 8.5 - 10.1 mg/dL    Phosphorus 1.1 (L) 2.6 - 4.7 mg/dL    Albumin 3.0 (L) 3.5 - 5.0 g/dL   CBC WITH AUTOMATED DIFF    Collection Time: 11/11/21  6:26 AM   Result Value Ref Range    WBC 9.2 3.6 - 11.0 K/uL    RBC 4.29 3.80 - 5.20 M/uL    HGB 12.8 11.5 - 16.0 g/dL    HCT 37.1 35.0 - 47.0 %    MCV 86.5 80.0 - 99.0 FL    MCH 29.8 26.0 - 34.0 PG    MCHC 34.5 30.0 - 36.5 g/dL    RDW 12.1 11.5 - 14.5 %    PLATELET 175 055 - 417 K/uL    MPV 9.3 8.9 - 12.9 FL    NRBC 0.0 0.0  WBC    ABSOLUTE NRBC 0.00 0.00 - 0.01 K/uL    NEUTROPHILS 71 32 - 75 %    LYMPHOCYTES 21 12 - 49 %    MONOCYTES 6 5 - 13 %    EOSINOPHILS 2 0 - 7 %    BASOPHILS 0 0 - 1 %    IMMATURE GRANULOCYTES 0 0 - 0.5 %    ABS. NEUTROPHILS 6.7 1.8 - 8.0 K/UL    ABS. LYMPHOCYTES 1.9 0.8 - 3.5 K/UL    ABS. MONOCYTES 0.5 0.0 - 1.0 K/UL    ABS. EOSINOPHILS 0.1 0.0 - 0.4 K/UL    ABS. BASOPHILS 0.0 0.0 - 0.1 K/UL    ABS. IMM.  GRANS. 0.0 0.00 - 0.04 K/UL    DF AUTOMATED                CTA ABDOMEN PELV W CONT   Final Result   Normal CT angiogram of the abdomen and pelvis. No evidence of GI   hemorrhage. There is however nonspecific colitis affecting the distal portions   of the colon as described. Considerations are for infectious, inflammatory or   even ischemic colitis; ischemic colitis would not be excluded by this exam   although the vasculature as visualized appears intact. Nonobstructing left   nephrolithiasis. Assessment:     Active Problems:    Acute lower GI hemorrhage (11/10/2021)        Plan:   1. Rectal bleeding     -11/11 colonoscopy - Noninfective gastroenteritis and colitis, unspecified. Repeat in 3 months. Biopsy was taken for histology.     -Hgb stable 12.8 today. Monitor and transfuse as needed.     -continue Cipro and Flagyl IV     -CBC in am.  2. Hypertension     -Continue home antihypertensive meds  3. Hyperlipidemia     -continue statins. 4. Anxiety     -Continue Celexa    Patient is clear for discharge per GI if with stable Hgb. Check in am.  Follow up in clinic 2 weeks to schedule a repeat colonoscopy in 3 months. Patient discussed with Dr James Bowman and agrees to above impression and plan. Thank you for allowing me to participate in this patients care    Signed By: Adrián Alvarez.  ALMA Mar     November 11, 2021

## 2021-11-11 NOTE — PROGRESS NOTES
Writer met with patient at bedside. Observation notice provided in writing to patient and/or caregiver as well as verbal explanation of the policy. Patients who are in outpatient status also receive the Observation notice. Patient has received notice and or patient representative has received via secure email, fax, or certified mail based on patient representative's preference.      Miladis Pineda

## 2021-11-12 VITALS
DIASTOLIC BLOOD PRESSURE: 82 MMHG | SYSTOLIC BLOOD PRESSURE: 131 MMHG | HEART RATE: 83 BPM | RESPIRATION RATE: 16 BRPM | TEMPERATURE: 98.9 F | OXYGEN SATURATION: 95 %

## 2021-11-12 PROBLEM — K52.9 ACUTE HEMORRHAGIC COLITIS: Status: ACTIVE | Noted: 2021-11-12

## 2021-11-12 LAB
ALBUMIN SERPL-MCNC: 3 G/DL (ref 3.5–5)
ALBUMIN/GLOB SERPL: 0.9 {RATIO} (ref 1.1–2.2)
ALP SERPL-CCNC: 102 U/L (ref 45–117)
ALT SERPL-CCNC: 17 U/L (ref 12–78)
ANION GAP SERPL CALC-SCNC: 6 MMOL/L (ref 5–15)
AST SERPL W P-5'-P-CCNC: 13 U/L (ref 15–37)
BASOPHILS # BLD: 0 K/UL (ref 0–0.1)
BASOPHILS NFR BLD: 0 % (ref 0–1)
BILIRUB SERPL-MCNC: 0.4 MG/DL (ref 0.2–1)
BUN SERPL-MCNC: 11 MG/DL (ref 6–20)
BUN/CREAT SERPL: 18 (ref 12–20)
CA-I BLD-MCNC: 8.6 MG/DL (ref 8.5–10.1)
CHLORIDE SERPL-SCNC: 111 MMOL/L (ref 97–108)
CO2 SERPL-SCNC: 24 MMOL/L (ref 21–32)
CREAT SERPL-MCNC: 0.61 MG/DL (ref 0.55–1.02)
DIFFERENTIAL METHOD BLD: NORMAL
EOSINOPHIL # BLD: 0.1 K/UL (ref 0–0.4)
EOSINOPHIL NFR BLD: 2 % (ref 0–7)
ERYTHROCYTE [DISTWIDTH] IN BLOOD BY AUTOMATED COUNT: 12.2 % (ref 11.5–14.5)
GLOBULIN SER CALC-MCNC: 3.2 G/DL (ref 2–4)
GLUCOSE SERPL-MCNC: 100 MG/DL (ref 65–100)
HCT VFR BLD AUTO: 38.8 % (ref 35–47)
HGB BLD-MCNC: 13.3 G/DL (ref 11.5–16)
IMM GRANULOCYTES # BLD AUTO: 0 K/UL (ref 0–0.04)
IMM GRANULOCYTES NFR BLD AUTO: 0 % (ref 0–0.5)
LYMPHOCYTES # BLD: 2.1 K/UL (ref 0.8–3.5)
LYMPHOCYTES NFR BLD: 32 % (ref 12–49)
MCH RBC QN AUTO: 30 PG (ref 26–34)
MCHC RBC AUTO-ENTMCNC: 34.3 G/DL (ref 30–36.5)
MCV RBC AUTO: 87.4 FL (ref 80–99)
MONOCYTES # BLD: 0.4 K/UL (ref 0–1)
MONOCYTES NFR BLD: 5 % (ref 5–13)
NEUTS SEG # BLD: 4 K/UL (ref 1.8–8)
NEUTS SEG NFR BLD: 61 % (ref 32–75)
NRBC # BLD: 0 K/UL (ref 0–0.01)
NRBC BLD-RTO: 0 PER 100 WBC
PLATELET # BLD AUTO: 251 K/UL (ref 150–400)
PMV BLD AUTO: 9.5 FL (ref 8.9–12.9)
POTASSIUM SERPL-SCNC: 4.1 MMOL/L (ref 3.5–5.1)
PROT SERPL-MCNC: 6.2 G/DL (ref 6.4–8.2)
RBC # BLD AUTO: 4.44 M/UL (ref 3.8–5.2)
SODIUM SERPL-SCNC: 141 MMOL/L (ref 136–145)
WBC # BLD AUTO: 6.6 K/UL (ref 3.6–11)

## 2021-11-12 PROCEDURE — 74011250637 HC RX REV CODE- 250/637: Performed by: NURSE PRACTITIONER

## 2021-11-12 PROCEDURE — 74011250637 HC RX REV CODE- 250/637: Performed by: HOSPITALIST

## 2021-11-12 PROCEDURE — 80053 COMPREHEN METABOLIC PANEL: CPT

## 2021-11-12 PROCEDURE — 36415 COLL VENOUS BLD VENIPUNCTURE: CPT

## 2021-11-12 PROCEDURE — G0378 HOSPITAL OBSERVATION PER HR: HCPCS

## 2021-11-12 PROCEDURE — 85025 COMPLETE CBC W/AUTO DIFF WBC: CPT

## 2021-11-12 RX ORDER — FLUCONAZOLE 200 MG/1
200 TABLET ORAL DAILY
Qty: 1 TABLET | Refills: 0 | Status: SHIPPED | OUTPATIENT
Start: 2021-11-12 | End: 2021-11-13

## 2021-11-12 RX ORDER — AMOXICILLIN AND CLAVULANATE POTASSIUM 500; 125 MG/1; MG/1
1 TABLET, FILM COATED ORAL 2 TIMES DAILY
Qty: 14 TABLET | Refills: 0 | Status: SHIPPED | OUTPATIENT
Start: 2021-11-12

## 2021-11-12 RX ADMIN — CITALOPRAM HYDROBROMIDE 20 MG: 20 TABLET ORAL at 08:47

## 2021-11-12 RX ADMIN — LEVONORGESTREL AND ETHINYL ESTRADIOL 1 TABLET: KIT at 09:00

## 2021-11-12 RX ADMIN — AMLODIPINE BESYLATE 5 MG: 5 TABLET ORAL at 08:47

## 2021-11-12 RX ADMIN — LISINOPRIL 40 MG: 40 TABLET ORAL at 09:00

## 2021-11-12 RX ADMIN — METRONIDAZOLE 500 MG: 500 TABLET ORAL at 08:47

## 2021-11-12 RX ADMIN — ATORVASTATIN CALCIUM 20 MG: 20 TABLET, FILM COATED ORAL at 08:47

## 2021-11-12 NOTE — PROGRESS NOTES
Discharge plan of care/case management plan validated with provider discharge order. Pt has DC orders home/self care for this afternoon. Pt has been cleared by GI; as well as, the attending provider. IV has been removed. Pt has no gill catheter, telemetry, or any other miscellaneous lines/tubes. Pt is alert, stable, and without complaints at this time. Reviewed DC instructions with pt and spouse. Instructions included: medication instructions, follow up appointments, and any associated education. Time was allotted for questions, and all questions were answered. Pt has requested to walk herself out with spouse. Pt is stable on her feet when she leaves the unit.

## 2021-11-12 NOTE — DISCHARGE SUMMARY
Admit date: 11/10/2021   Admitting Provider: Beth Gresham MD    Discharge date: 11/12/2021  Discharging Provider: Maxime Mae PA-C      * Admission Diagnoses: Acute lower GI hemorrhage [K92.2]    * Discharge Diagnoses:    Hospital Problems as of 11/12/2021 Date Reviewed: 11/10/2021          Codes Class Noted - Resolved POA    Acute hemorrhagic colitis ICD-10-CM: K52.9  ICD-9-CM: 558.9  11/12/2021 - Present Unknown        Acute lower GI hemorrhage ICD-10-CM: K92.2  ICD-9-CM: 578.9  11/10/2021 - Present Yes              * Hospital Course:   Ms. Linda Finley is a 43 yo female with a pmh of HTN and hyperlipidemia who presented with a abdominal pain, diarrhea and bright red blood in her stool. Positive fecal occult on 11/9/21. Elevated CRP. CT abdomen/pelvis shows nonspecific colitis/wall thickening of the mid descending and proximal sigmoid colon with surrounding fat stranding. No evidence of GI hemorrhage. Ischemic colitis could not be excluded. There is also a 3 mm nonobstructing left nephrolithiasis. GI following with colonoscopy . Colonoscopy with noninfective gastroenteritis and colitis of the sigmoid colon. Patient require follow-up in 2 weeks with Dr. Jazzmine Seaman office. Patient should also have a follow-up colonoscopy within the next 3 months. Continue antibiotics, Augmentin for 7 additional days. Patient had a reaction to Cipro which was localized although consistent with an allergic reaction. Patient has had no further bleeding and abdominal pain has subsided. Patient will follow up with her primary care physician Dr. Fco Encinas in approximately 1 week. Patient is tolerating p.o. diet without difficulty. Patient was initially treated with Cipro and Flagyl until the allergic reaction event. Impression:  1.  Acute Colitis  Positive stool occult  IV cipro and flagyl changed to Augmentin  Colonoscopy as above  CT abdomen/ Pelvis  shows nonspecific colitis with wall thickening of the mid descending and proximal sigmoid colon with surrounding fat stranding. No evidence of GI hemorrhage. Ischemic colitis could not be excluded. There is also a 3 mm nonobstructing left nephrolithiasis. Suspect this is infectious related  Hemoglobin stable, 12.8-13.3     2. HTN  Continue amlodipine  Continue lisinopril      3. Hyperlipidemia   Continue atorvastatin      4. Depression  Continue Celexa      5. Nephrolithiasis   3mm nonobstructing   Stable     * Procedures:   Procedure(s):  COLONOSCOPY  COLON BIOPSY      Consults:   Dr. Melissa Em, gastroenterology    Significant Diagnostic Studies: As discussed in hospital course    Discharge Exam:  Visit Vitals  /82 (BP 1 Location: Right upper arm, BP Patient Position: At rest)   Pulse 83   Temp 98.9 °F (37.2 °C)   Resp 16   SpO2 95%     PHYSICAL EXAM:  Constitutional: Alert in no acute distress   HEENT: Sclerae anicteric, The neck is supple. Cardiovascular: Regular rate and rhythm. No murmurs, gallops, or rubs. Keira Airam Respiratory: Clear breath sounds with no wheezes, rales, or rhonchi. GI: Abdomen nondistended, soft, and nontender. Normal active bowel sounds. Rectal: Deferred   Musculoskeletal: No pitting edema of the lower legs. Extremities have good range of motion. Neurological:  Patient is alert and oriented. Cranial nerves II-XII intact  Psychiatric: Mood appears appropriate with judgement intact. Lymphatic: No cervical or supraclavicular adenopathy. Skin: No rashes or breakdown of the skin      * Discharge Condition: stable  * Disposition: Home    Discharge Medications:  Current Discharge Medication List      START taking these medications    Details   amoxicillin-clavulanate (Augmentin) 500-125 mg per tablet Take 1 Tablet by mouth two (2) times a day. Qty: 14 Tablet, Refills: 0  Start date: 11/12/2021      fluconazole (Diflucan) 200 mg tablet Take 1 Tablet by mouth daily for 1 day. FDA advises cautious prescribing of oral fluconazole in pregnancy.   Qty: 1 Tablet, Refills: 0  Start date: 11/12/2021, End date: 11/13/2021         CONTINUE these medications which have NOT CHANGED    Details   Jolessa 0.15 mg-30 mcg (91) 3MPk Take 1 Tablet by mouth daily. atorvastatin (LIPITOR) 20 mg tablet Take 20 mg by mouth daily. amLODIPine (NORVASC) 5 mg tablet Take 5 mg by mouth daily. benazepriL (LOTENSIN) 40 mg tablet Take 40 mg by mouth daily. citalopram (CELEXA) 20 mg tablet Take 20 mg by mouth daily. * Follow-up Care/Patient Instructions:   Activity: Activity as tolerated  Diet: Regular Diet  Wound Care: None needed    Follow-up Information     Follow up With Specialties Details Why Contact Info    Ruben Nj MD Family Medicine In 1 week  [address] [phone]      Fiona Farias MD Gastroenterology In 2 weeks  [address]             Discharge summary greater than 35 minutes spent with the patient performing discharge instructions, medication review and physical exam    Signed:  Latisha Salinas PA-C  11/12/2021  10:17 AM

## 2022-03-19 PROBLEM — K92.2 ACUTE LOWER GI HEMORRHAGE: Status: ACTIVE | Noted: 2021-11-10

## 2022-03-20 PROBLEM — K52.9 ACUTE HEMORRHAGIC COLITIS: Status: ACTIVE | Noted: 2021-11-12

## 2022-06-15 ENCOUNTER — HOSPITAL ENCOUNTER (EMERGENCY)
Age: 42
Discharge: HOME | End: 2022-06-15
Attending: EMERGENCY MEDICINE
Payer: COMMERCIAL

## 2022-06-15 VITALS
SYSTOLIC BLOOD PRESSURE: 157 MMHG | OXYGEN SATURATION: 97 % | HEIGHT: 62 IN | DIASTOLIC BLOOD PRESSURE: 98 MMHG | BODY MASS INDEX: 37.1 KG/M2 | HEART RATE: 84 BPM | TEMPERATURE: 98.4 F | RESPIRATION RATE: 16 BRPM | WEIGHT: 201.6 LBS

## 2022-06-15 DIAGNOSIS — N20.0 KIDNEY STONE: Primary | ICD-10-CM

## 2022-06-15 LAB
APPEARANCE UR: CLEAR
BACTERIA URNS QL MICRO: ABNORMAL /HPF
BILIRUB UR QL: NEGATIVE
COLOR UR: ABNORMAL
GLUCOSE UR STRIP.AUTO-MCNC: NEGATIVE MG/DL
HCG UR QL: NEGATIVE
HGB UR QL STRIP: ABNORMAL
KETONES UR QL STRIP.AUTO: NEGATIVE MG/DL
LEUKOCYTE ESTERASE UR QL STRIP.AUTO: NEGATIVE
MUCOUS THREADS URNS QL MICRO: ABNORMAL /LPF
NITRITE UR QL STRIP.AUTO: NEGATIVE
PH UR STRIP: 6 [PH] (ref 5–8)
PROT UR STRIP-MCNC: 30 MG/DL
RBC #/AREA URNS HPF: >100 /HPF (ref 0–3)
SP GR UR REFRACTOMETRY: >1.03 (ref 1–1.03)
UA: UC IF INDICATED,UAUC: ABNORMAL
UROBILINOGEN UR QL STRIP.AUTO: 0.2 EU/DL (ref 0.2–1)
WBC URNS QL MICRO: ABNORMAL /HPF (ref 0–5)

## 2022-06-15 PROCEDURE — 87086 URINE CULTURE/COLONY COUNT: CPT

## 2022-06-15 PROCEDURE — 81025 URINE PREGNANCY TEST: CPT

## 2022-06-15 PROCEDURE — 96375 TX/PRO/DX INJ NEW DRUG ADDON: CPT

## 2022-06-15 PROCEDURE — 96374 THER/PROPH/DIAG INJ IV PUSH: CPT

## 2022-06-15 PROCEDURE — 81001 URINALYSIS AUTO W/SCOPE: CPT

## 2022-06-15 PROCEDURE — 74011250637 HC RX REV CODE- 250/637: Performed by: EMERGENCY MEDICINE

## 2022-06-15 PROCEDURE — 74011250636 HC RX REV CODE- 250/636: Performed by: EMERGENCY MEDICINE

## 2022-06-15 PROCEDURE — 99284 EMERGENCY DEPT VISIT MOD MDM: CPT

## 2022-06-15 RX ORDER — TAMSULOSIN HYDROCHLORIDE 0.4 MG/1
0.4 CAPSULE ORAL DAILY
Qty: 10 CAPSULE | Refills: 0 | Status: SHIPPED | OUTPATIENT
Start: 2022-06-15 | End: 2022-06-25

## 2022-06-15 RX ORDER — OXYCODONE AND ACETAMINOPHEN 5; 325 MG/1; MG/1
1 TABLET ORAL
Qty: 12 TABLET | Refills: 0 | Status: SHIPPED | OUTPATIENT
Start: 2022-06-15 | End: 2022-06-18

## 2022-06-15 RX ORDER — TAMSULOSIN HYDROCHLORIDE 0.4 MG/1
0.4 CAPSULE ORAL
Status: COMPLETED | OUTPATIENT
Start: 2022-06-15 | End: 2022-06-15

## 2022-06-15 RX ORDER — MORPHINE SULFATE 4 MG/ML
4 INJECTION INTRAVENOUS ONCE
Status: COMPLETED | OUTPATIENT
Start: 2022-06-15 | End: 2022-06-15

## 2022-06-15 RX ORDER — ONDANSETRON 2 MG/ML
4 INJECTION INTRAMUSCULAR; INTRAVENOUS ONCE
Status: COMPLETED | OUTPATIENT
Start: 2022-06-15 | End: 2022-06-15

## 2022-06-15 RX ADMIN — TAMSULOSIN HYDROCHLORIDE 0.4 MG: 0.4 CAPSULE ORAL at 22:24

## 2022-06-15 RX ADMIN — MORPHINE SULFATE 4 MG: 4 INJECTION, SOLUTION INTRAMUSCULAR; INTRAVENOUS at 22:24

## 2022-06-15 RX ADMIN — ONDANSETRON 4 MG: 2 INJECTION INTRAMUSCULAR; INTRAVENOUS at 22:24

## 2022-06-16 NOTE — ED TRIAGE NOTES
Pt states that she has been feeling urinary urgency starting early this morning, but within the last hour or so she has been having left flank pain rated 6/10 and nausea.

## 2022-06-16 NOTE — ED PROVIDER NOTES
EMERGENCY DEPARTMENT HISTORY AND PHYSICAL EXAM  ?    Date: 6/15/2022  Patient Name: Christy Pickering    History of Presenting Illness    Patient presents with:  Flank Pain      History Provided By: Patient    HPI: Christy Pickering, 36 y.o. female with a past medical history significant for kidney stone, hypertension, hyperlipidemia presents to the ED with cc of sudden onset of left flank pain couple hours ago. She felt urinary urgency. During today she had urinary frequency. She has had lithotripsy for kidney stones in the past.  She tells me that she had a CT scan in November at [facility], and there was an incidental finding of a 3 mm stone in the left. There are no other complaints, changes, or physical findings at this time. PCP: Clinton Nunez MD    No current facility-administered medications on file prior to encounter. Current Outpatient Medications on File Prior to Encounter:  amoxicillin-clavulanate (Augmentin) 500-125 mg per tablet, Take 1 Tablet by mouth two (2) times a day. (Patient not taking: Reported on 6/15/2022), Disp: 14 Tablet, Rfl: 0  Jolessa 0.15 mg-30 mcg (91) 3MPk, Take 1 Tablet by mouth daily. , Disp: , Rfl:   atorvastatin (LIPITOR) 20 mg tablet, Take 20 mg by mouth daily. , Disp: , Rfl:   amLODIPine (NORVASC) 5 mg tablet, Take 5 mg by mouth daily. , Disp: , Rfl:   benazepriL (LOTENSIN) 40 mg tablet, Take 40 mg by mouth daily. , Disp: , Rfl:   citalopram (CELEXA) 20 mg tablet, Take 20 mg by mouth daily. , Disp: , Rfl:         Past History    Past Medical History:  Past Medical History:  No date: Hyperlipidemia  No date: Hypertension    Past Surgical History:  Past Surgical History:  11/11/2021: COLONOSCOPY; N/A     Comment:  COLONOSCOPY performed by Malina Loredo MD at [facility]               ENDOSCOPY  No date: HX TUBAL LIGATION    Family History:  Review of patient's family history indicates:  Problem: Prostate Cancer     Relation: Father         Age of Onset: (Not Specified)      Social History:  Social History   Tobacco Use     Smoking status: Never Smoker     Smokeless tobacco: Never Used   Vaping Use     Vaping Use: Never used   Alcohol use: Never   Drug use: Never      Allergies:  -- Ciprofloxacin -- Itching      Review of Systems  [unfilled]    Physical Exam  @MT@    Diagnostic Study Results    Labs -  Recent Results (from the past 12 hour(s))  -URINALYSIS W/ REFLEX CULTURE:   Collection Time: 06/15/22  8:30 PM  Specimen: Urine      Result                      Value             Ref Range          Color                       Yellow/Straw                         Appearance                  Clear             Clear              Specific gravity            >1.030 (H)        1.003 - 1.030      pH (UA)                     6.0               5.0 - 8.0          Protein                     30 (A)            Negative mg/*      Glucose                     Negative          Negative mg/*      Ketone                      Negative          Negative mg/*      Bilirubin                   Negative          Negative           Blood                       Large (A)         Negative           Urobilinogen                0.2               0.2 - 1.0 EU*      Nitrites                    Negative          Negative           Leukocyte Esterase          Negative          Negative           WBC                         10-20             0 - 5 /hpf         RBC                         >100 (H)          0 - 3 /hpf         Bacteria                    1+ (A)            Negative /hpf      UA:UC IF INDICATED                            Culture not *  Urine Culture Ordered (A)      Mucus                       1+ (A)            Negative /lpf  -HCG URINE, QL:   Collection Time: 06/15/22  8:30 PM      Result                      Value             Ref Range          HCG urine, QL               Negative          Negative         Radiologic Studies -   No orders to display  CT Results  (Last 48 hours)   None     CXR Results (Last 48 hours)   None         Medical Decision Making  I am the first provider for this patient. I reviewed the vital signs, available nursing notes, past medical history, past surgical history, family history and social history. Vital Signs-Reviewed the patient's vital signs. Empty flowsheet group. Records Reviewed: Nursing Notes    Provider Notes (Medical Decision Making):   Presents with flank pain and hematuria. She had a CT scan in November of last year, CT will be deferred. ED Course:     Initial assessment performed. The patients presenting problems have been discussed, and they are in agreement with the care plan formulated and outlined with them. I have encouraged them to ask questions as they arise throughout their visit. PLAN:  1. Current Discharge Medication List      2. Follow-up Information    None    Return to ED if worse     Diagnosis    Clinical Impression: No diagnosis found.                  Past Medical History:   Diagnosis Date    Hyperlipidemia     Hypertension        Past Surgical History:   Procedure Laterality Date    COLONOSCOPY N/A 11/11/2021    COLONOSCOPY performed by Juanita Prather MD at [facility] HX TUBAL LIGATION           Family History:   Problem Relation Age of Onset    Prostate Cancer Father        Social History     Socioeconomic History    Marital status:      Spouse name: Not on file    Number of children: Not on file    Years of education: Not on file    Highest education level: Not on file   Occupational History    Not on file   Tobacco Use    Smoking status: Never Smoker    Smokeless tobacco: Never Used   Vaping Use    Vaping Use: Never used   Substance and Sexual Activity    Alcohol use: Never    Drug use: Never    Sexual activity: Yes     Partners: Male     Birth control/protection: Pill   Other Topics Concern    Not on file   Social History Narrative    Not on file     Social Determinants of Health     Financial Resource Strain:     Difficulty of Paying Living Expenses: Not on file   Food Insecurity:     Worried About Running Out of Food in the Last Year: Not on file    Haylee of Food in the Last Year: Not on file   Transportation Needs:     Lack of Transportation (Medical): Not on file    Lack of Transportation (Non-Medical): Not on file   Physical Activity:     Days of Exercise per Week: Not on file    Minutes of Exercise per Session: Not on file   Stress:     Feeling of Stress : Not on file   Social Connections:     Frequency of Communication with Friends and Family: Not on file    Frequency of Social Gatherings with Friends and Family: Not on file    Attends Buddhism Services: Not on file    Active Member of [address] [place] [address] or Organizations: Not on file    Attends Club or Organization Meetings: Not on file    Marital Status: Not on file   Intimate Partner Violence:     Fear of Current or Ex-Partner: Not on file    Emotionally Abused: Not on file    Physically Abused: Not on file    Sexually Abused: Not on file   Housing Stability:     Unable to Pay for Housing in the Last Year: Not on file    Number of [place] in the Last Year: Not on file    Unstable Housing in the Last Year: Not on file         ALLERGIES: Ciprofloxacin    Review of Systems   Constitutional: Negative. HENT: Negative. Eyes: Negative. Respiratory: Negative. Cardiovascular: Negative. Gastrointestinal: Negative. Endocrine: Negative. Genitourinary: Positive for flank pain, frequency and urgency. Negative for hematuria. Neurological: Negative. Hematological: Negative. Psychiatric/Behavioral: Negative. Vitals:    06/15/22 2025   BP: (!) 161/91   Pulse: (!) 110   Resp: 16   Temp: 98.4 °F (36.9 °C)   SpO2: 97%   Weight: 91.4 kg (201 lb 9.6 oz)   Height: 5' 2\" (1.575 m)            Physical Exam  Vitals and nursing note reviewed. Constitutional:       Appearance: Normal appearance.    HENT:      Head: Normocephalic and atraumatic. Nose: Nose normal.      Mouth/Throat:      Mouth: Mucous membranes are moist.      Pharynx: Oropharynx is clear. Eyes:      Extraocular Movements: Extraocular movements intact. Conjunctiva/sclera: Conjunctivae normal.      Pupils: Pupils are equal, round, and reactive to light. Cardiovascular:      Rate and Rhythm: Normal rate and regular rhythm. Pulses: Normal pulses. Heart sounds: Normal heart sounds. Pulmonary:      Effort: Pulmonary effort is normal.      Breath sounds: Normal breath sounds. Abdominal:      General: Abdomen is flat. Bowel sounds are normal.      Palpations: Abdomen is soft. Musculoskeletal:         General: Normal range of motion. Skin:     General: Skin is warm and dry. Neurological:      General: No focal deficit present. Mental Status: She is alert and oriented to person, place, and time.    Psychiatric:         Mood and Affect: Mood normal.         Behavior: Behavior normal.          MDM       Procedures

## 2022-06-17 LAB
BACTERIA SPEC CULT: NORMAL
COLONY COUNT,CNT: NORMAL
COLONY COUNT,CNT: NORMAL
SPECIAL REQUESTS,SREQ: NORMAL

## 2022-07-27 ENCOUNTER — HOSPITAL ENCOUNTER (EMERGENCY)
Age: 42
Discharge: HOME | End: 2022-07-27
Attending: EMERGENCY MEDICINE
Payer: COMMERCIAL

## 2022-07-27 VITALS
DIASTOLIC BLOOD PRESSURE: 88 MMHG | HEART RATE: 92 BPM | RESPIRATION RATE: 18 BRPM | SYSTOLIC BLOOD PRESSURE: 131 MMHG | BODY MASS INDEX: 36.99 KG/M2 | WEIGHT: 201 LBS | HEIGHT: 62 IN | OXYGEN SATURATION: 100 % | TEMPERATURE: 98.1 F

## 2022-07-27 DIAGNOSIS — N30.01 ACUTE CYSTITIS WITH HEMATURIA: Primary | ICD-10-CM

## 2022-07-27 LAB
APPEARANCE UR: ABNORMAL
BACTERIA URNS QL MICRO: ABNORMAL /HPF
BILIRUB UR QL: NEGATIVE
COLOR UR: ABNORMAL
GLUCOSE UR STRIP.AUTO-MCNC: NEGATIVE MG/DL
HGB UR QL STRIP: ABNORMAL
KETONES UR QL STRIP.AUTO: ABNORMAL MG/DL
LEUKOCYTE ESTERASE UR QL STRIP.AUTO: ABNORMAL
NITRITE UR QL STRIP.AUTO: NEGATIVE
PH UR STRIP: 6.5 [PH] (ref 5–8)
PROT UR STRIP-MCNC: 30 MG/DL
RBC #/AREA URNS HPF: ABNORMAL /HPF (ref 0–3)
SP GR UR REFRACTOMETRY: 1.02 (ref 1–1.03)
UROBILINOGEN UR QL STRIP.AUTO: 0.2 EU/DL (ref 0.2–1)
WBC URNS QL MICRO: ABNORMAL /HPF (ref 0–5)

## 2022-07-27 PROCEDURE — 81001 URINALYSIS AUTO W/SCOPE: CPT

## 2022-07-27 PROCEDURE — 99284 EMERGENCY DEPT VISIT MOD MDM: CPT

## 2022-07-27 PROCEDURE — 96372 THER/PROPH/DIAG INJ SC/IM: CPT

## 2022-07-27 PROCEDURE — 74011250636 HC RX REV CODE- 250/636: Performed by: EMERGENCY MEDICINE

## 2022-07-27 PROCEDURE — 74011250637 HC RX REV CODE- 250/637: Performed by: EMERGENCY MEDICINE

## 2022-07-27 RX ORDER — CEPHALEXIN 250 MG/1
500 CAPSULE ORAL
Status: COMPLETED | OUTPATIENT
Start: 2022-07-27 | End: 2022-07-27

## 2022-07-27 RX ORDER — CEPHALEXIN 500 MG/1
500 CAPSULE ORAL 2 TIMES DAILY
Qty: 10 CAPSULE | Refills: 0 | Status: SHIPPED | OUTPATIENT
Start: 2022-07-27 | End: 2022-08-01

## 2022-07-27 RX ORDER — PHENAZOPYRIDINE HYDROCHLORIDE 200 MG/1
200 TABLET, FILM COATED ORAL 3 TIMES DAILY
Qty: 5 TABLET | Refills: 0 | Status: SHIPPED | OUTPATIENT
Start: 2022-07-27 | End: 2022-07-29

## 2022-07-27 RX ORDER — TAMSULOSIN HYDROCHLORIDE 0.4 MG/1
0.4 CAPSULE ORAL DAILY
Qty: 5 CAPSULE | Refills: 0 | Status: SHIPPED | OUTPATIENT
Start: 2022-07-27 | End: 2022-08-01

## 2022-07-27 RX ORDER — PHENAZOPYRIDINE HYDROCHLORIDE 100 MG/1
200 TABLET, FILM COATED ORAL
Status: COMPLETED | OUTPATIENT
Start: 2022-07-27 | End: 2022-07-27

## 2022-07-27 RX ORDER — TAMSULOSIN HYDROCHLORIDE 0.4 MG/1
0.8 CAPSULE ORAL
Status: COMPLETED | OUTPATIENT
Start: 2022-07-27 | End: 2022-07-27

## 2022-07-27 RX ORDER — KETOROLAC TROMETHAMINE 30 MG/ML
60 INJECTION, SOLUTION INTRAMUSCULAR; INTRAVENOUS
Status: COMPLETED | OUTPATIENT
Start: 2022-07-27 | End: 2022-07-27

## 2022-07-27 RX ADMIN — CEPHALEXIN 500 MG: 250 CAPSULE ORAL at 12:11

## 2022-07-27 RX ADMIN — TAMSULOSIN HYDROCHLORIDE 0.8 MG: 0.4 CAPSULE ORAL at 12:11

## 2022-07-27 RX ADMIN — PHENAZOPYRIDINE 200 MG: 100 TABLET ORAL at 12:11

## 2022-07-27 RX ADMIN — KETOROLAC TROMETHAMINE 60 MG: 30 INJECTION, SOLUTION INTRAMUSCULAR at 11:23

## 2022-07-27 NOTE — ED PROVIDER NOTES
EMERGENCY DEPARTMENT HISTORY AND PHYSICAL EXAM      Date: 7/27/2022  Patient Name: Lenin Carney    History of Presenting Illness     Chief Complaint   Patient presents with    Urinary Pain       History Provided By: Patient    HPI: Lenin Carney, 36 y.o. female with a significant past medical history of nephrolithiasis, hypertension, hyperlipidemia presents to the ED with lower abdominal pain with burning urination with pressure sensation urinary pain intensity 8/10 described as sharp and achy, patient saw her urologist 2 days ago where an ultrasound was performed and patient told her evaluation with normal findings    There are no other complaints, changes, or physical findings at this time. PCP: Farzana Rowell MD    No current facility-administered medications on file prior to encounter. Current Outpatient Medications on File Prior to Encounter   Medication Sig Dispense Refill    amoxicillin-clavulanate (Augmentin) 500-125 mg per tablet Take 1 Tablet by mouth two (2) times a day. (Patient not taking: Reported on 6/15/2022) 14 Tablet 0    Jolessa 0.15 mg-30 mcg (91) 3MPk Take 1 Tablet by mouth daily. atorvastatin (LIPITOR) 20 mg tablet Take 20 mg by mouth daily. amLODIPine (NORVASC) 5 mg tablet Take 5 mg by mouth daily. benazepriL (LOTENSIN) 40 mg tablet Take 40 mg by mouth daily. citalopram (CELEXA) 20 mg tablet Take 20 mg by mouth daily.          Past History     Past Medical History:  Past Medical History:   Diagnosis Date    Hyperlipidemia     Hypertension        Past Surgical History:  Past Surgical History:   Procedure Laterality Date    COLONOSCOPY N/A 11/11/2021    COLONOSCOPY performed by Maria Del Carmen Wallis MD at [address] [place] [address]    HX TUBAL LIGATION         Family History:  Family History   Problem Relation Age of Onset    Prostate Cancer Father        Social History:  Social History     Tobacco Use    Smoking status: Never    Smokeless tobacco: Never   Vaping Use    Vaping Use: Never used   Substance Use Topics    Alcohol use: Never    Drug use: Never       Allergies: Allergies   Allergen Reactions    Ciprofloxacin Itching       Review of Systems   Review of Systems   Constitutional:  Negative for chills and fever. HENT:  Negative for rhinorrhea and sore throat. Eyes:  Negative for pain and visual disturbance. Respiratory:  Negative for cough and shortness of breath. Cardiovascular:  Negative for chest pain and leg swelling. Gastrointestinal:  Negative for abdominal pain and vomiting. Endocrine: Negative for polydipsia and polyuria. Genitourinary:  Positive for decreased urine volume, difficulty urinating, dysuria, frequency and pelvic pain. Negative for flank pain and hematuria. Musculoskeletal:  Negative for back pain and neck pain. Skin:  Negative for color change and pallor. Neurological:  Negative for weakness and headaches. Psychiatric/Behavioral:  Negative for agitation and suicidal ideas. Physical Exam   Physical Exam  Vitals and nursing note reviewed. Constitutional:       General: She is not in acute distress. Appearance: She is not ill-appearing, toxic-appearing or diaphoretic. HENT:      Head: Normocephalic and atraumatic. Right Ear: Tympanic membrane normal.      Left Ear: Tympanic membrane normal.      Nose: Nose normal. No congestion. Mouth/Throat:      Mouth: Mucous membranes are moist.      Pharynx: Oropharynx is clear. Eyes:      Extraocular Movements: Extraocular movements intact. Conjunctiva/sclera: Conjunctivae normal.      Pupils: Pupils are equal, round, and reactive to light. Cardiovascular:      Rate and Rhythm: Normal rate and regular rhythm. Pulses: Normal pulses. Heart sounds: Normal heart sounds. Pulmonary:      Effort: Pulmonary effort is normal.      Breath sounds: Normal breath sounds. Abdominal:      General: Bowel sounds are normal.      Palpations: Abdomen is soft. Tenderness: There is abdominal tenderness in the suprapubic area. There is no right CVA tenderness or left CVA tenderness. Musculoskeletal:         General: No tenderness, deformity or signs of injury. Normal range of motion. Cervical back: Normal range of motion and neck supple. No rigidity or tenderness. Lymphadenopathy:      Cervical: No cervical adenopathy. Skin:     General: Skin is warm and dry. Capillary Refill: Capillary refill takes less than 2 seconds. Findings: No rash. Neurological:      General: No focal deficit present. Mental Status: She is alert and oriented to person, place, and time. Cranial Nerves: No cranial nerve deficit. Sensory: No sensory deficit. Psychiatric:         Mood and Affect: Mood normal.         Behavior: Behavior normal.       Lab and Diagnostic Study Results   Labs -     Recent Results (from the past 12 hour(s))   URINALYSIS W/ RFLX MICROSCOPIC    Collection Time: 07/27/22 10:54 AM   Result Value Ref Range    Color Yellow/Straw      Appearance Cloudy (A) Clear      Specific gravity 1.025 1.003 - 1.030      pH (UA) 6.5 5.0 - 8.0      Protein 30 (A) Negative mg/dL    Glucose Negative Negative mg/dL    Ketone Trace (A) Negative mg/dL    Bilirubin Negative Negative      Blood Large (A) Negative      Urobilinogen 0.2 0.2 - 1.0 EU/dL    Nitrites Negative Negative      Leukocyte Esterase Moderate (A) Negative     URINE MICROSCOPIC    Collection Time: 07/27/22 10:54 AM   Result Value Ref Range    WBC  0 - 5 /hpf    RBC  0 - 3 /hpf    Bacteria 3+ (A) Negative /hpf       Radiologic Studies -   @lastxrresult@  CT Results  (Last 48 hours)      None          CXR Results  (Last 48 hours)      None            Medical Decision Making and ED Course   - I am the first provider for this patient. I reviewed the vital signs, available nursing notes, past medical history, past surgical history, family history and social history.     - Initial assessment performed. The patients presenting problems have been discussed, and they are in agreement with the care plan formulated and outlined with them. I have encouraged them to ask questions as they arise throughout their visit. Vital Signs-Reviewed the patient's vital signs. Patient Vitals for the past 12 hrs:   Temp Pulse Resp BP SpO2   07/27/22 1126 -- 92 18 131/88 100 %   07/27/22 1047 98.1 °F (36.7 °C) (!) 101 18 (!) 175/86 98 %       Differential Diagnosis & Medical Decision Making Provider Note:   Dysuria DDx UTI, nephrolithiasis, soft tissue injury  MDM       ED Course:        Procedures   Performed by: Laquita Andrade MD  Procedures      Disposition   Disposition: Condition stable and improved  DC- Adult Discharges: All of the diagnostic tests were reviewed and questions answered. Diagnosis, care plan and treatment options were discussed. The patient understands the instructions and will follow up as directed. The patients results have been reviewed with them. They have been counseled regarding their diagnosis. The patient verbally convey understanding and agreement of the signs, symptoms, diagnosis, treatment and prognosis and additionally agrees to follow up as recommended with their PCP in 24 - 48 hours. They also agree with the care-plan and convey that all of their questions have been answered. I have also put together some discharge instructions for them that include: 1) educational information regarding their diagnosis, 2) how to care for their diagnosis at home, as well a 3) list of reasons why they would want to return to the ED prior to their follow-up appointment, should their condition change. Discharged    DISCHARGE PLAN:  1. Current Discharge Medication List        START taking these medications    Details   phenazopyridine (Pyridium) 200 mg tablet Take 1 Tablet by mouth three (3) times daily for 2 days.   Qty: 5 Tablet, Refills: 0      cephALEXin (Keflex) 500 mg capsule Take 1 Capsule by mouth two (2) times a day for 5 days. Qty: 10 Capsule, Refills: 0      tamsulosin (Flomax) 0.4 mg capsule Take 1 Capsule by mouth in the morning for 5 days. Qty: 5 Capsule, Refills: 0           CONTINUE these medications which have NOT CHANGED    Details   amoxicillin-clavulanate (Augmentin) 500-125 mg per tablet Take 1 Tablet by mouth two (2) times a day. Qty: 14 Tablet, Refills: 0      Jolessa 0.15 mg-30 mcg (91) 3MPk Take 1 Tablet by mouth daily. atorvastatin (LIPITOR) 20 mg tablet Take 20 mg by mouth daily. amLODIPine (NORVASC) 5 mg tablet Take 5 mg by mouth daily. benazepriL (LOTENSIN) 40 mg tablet Take 40 mg by mouth daily. citalopram (CELEXA) 20 mg tablet Take 20 mg by mouth daily. 2.   Follow-up Information       Follow up With Specialties Details Why Contact Info    Your urologist  Schedule an appointment as soon as possible for a visit       Ruben Nj MD [facility] [place] Schedule an appointment as soon as possible for a visit   [address] [place] [address]  [phone]            3. Return to ED if worse   4. Current Discharge Medication List        START taking these medications    Details   phenazopyridine (Pyridium) 200 mg tablet Take 1 Tablet by mouth three (3) times daily for 2 days. Qty: 5 Tablet, Refills: 0  Start date: 7/27/2022, End date: 7/29/2022      cephALEXin (Keflex) 500 mg capsule Take 1 Capsule by mouth two (2) times a day for 5 days. Qty: 10 Capsule, Refills: 0  Start date: 7/27/2022, End date: 8/1/2022      tamsulosin (Flomax) 0.4 mg capsule Take 1 Capsule by mouth in the morning for 5 days. Qty: 5 Capsule, Refills: 0  Start date: 7/27/2022, End date: 8/1/2022            Remove if admitted/transferred    Diagnosis/Clinical Impression     Clinical Impression:   1. Acute cystitis with hematuria        Attestations: Ermelinda MUÑOZ MD, am the primary clinician of record.     Please note that this dictation was completed with [employer], the [employer] voice recognition software. Quite often unanticipated grammatical, syntax, homophones, and other interpretive errors are inadvertently transcribed by the computer software. Please disregard these errors. Please excuse any errors that have escaped final proofreading. Thank you.

## 2022-09-28 ENCOUNTER — TRANSCRIBE ORDER (OUTPATIENT)
Dept: SCHEDULING | Age: 42
End: 2022-09-28

## 2022-09-28 DIAGNOSIS — Z12.31 VISIT FOR SCREENING MAMMOGRAM: Primary | ICD-10-CM

## 2022-10-13 ENCOUNTER — HOSPITAL ENCOUNTER (OUTPATIENT)
Dept: MAMMOGRAPHY | Age: 42
Discharge: HOME | End: 2022-10-13
Attending: OBSTETRICS & GYNECOLOGY
Payer: COMMERCIAL

## 2022-10-13 DIAGNOSIS — Z12.31 VISIT FOR SCREENING MAMMOGRAM: ICD-10-CM

## 2022-10-13 PROCEDURE — 77063 BREAST TOMOSYNTHESIS BI: CPT

## 2022-12-01 ENCOUNTER — HOSPITAL ENCOUNTER (EMERGENCY)
Age: 42
Discharge: HOME | End: 2022-12-02
Attending: EMERGENCY MEDICINE
Payer: COMMERCIAL

## 2022-12-01 DIAGNOSIS — I10 PRIMARY HYPERTENSION: ICD-10-CM

## 2022-12-01 DIAGNOSIS — R42 DIZZINESS: Primary | ICD-10-CM

## 2022-12-01 LAB
APPEARANCE UR: CLEAR
BASOPHILS # BLD: 0 K/UL (ref 0–0.1)
BASOPHILS NFR BLD: 0 % (ref 0–1)
BILIRUB UR QL: NEGATIVE
COLOR UR: ABNORMAL
DIFFERENTIAL METHOD BLD: NORMAL
EOSINOPHIL # BLD: 0 K/UL (ref 0–0.4)
EOSINOPHIL NFR BLD: 0 % (ref 0–7)
ERYTHROCYTE [DISTWIDTH] IN BLOOD BY AUTOMATED COUNT: 12.3 % (ref 11.5–14.5)
GLUCOSE UR STRIP.AUTO-MCNC: NEGATIVE MG/DL
HCT VFR BLD AUTO: 37.5 % (ref 35–47)
HGB BLD-MCNC: 13 G/DL (ref 11.5–16)
HGB UR QL STRIP: NEGATIVE
IMM GRANULOCYTES # BLD AUTO: 0 K/UL (ref 0–0.04)
IMM GRANULOCYTES NFR BLD AUTO: 0 % (ref 0–0.5)
KETONES UR QL STRIP.AUTO: NEGATIVE MG/DL
LEUKOCYTE ESTERASE UR QL STRIP.AUTO: ABNORMAL
LYMPHOCYTES # BLD: 2.1 K/UL (ref 0.8–3.5)
LYMPHOCYTES NFR BLD: 30 % (ref 12–49)
MCH RBC QN AUTO: 30.2 PG (ref 26–34)
MCHC RBC AUTO-ENTMCNC: 34.7 G/DL (ref 30–36.5)
MCV RBC AUTO: 87 FL (ref 80–99)
MONOCYTES # BLD: 0.4 K/UL (ref 0–1)
MONOCYTES NFR BLD: 6 % (ref 5–13)
NEUTS SEG # BLD: 4.5 K/UL (ref 1.8–8)
NEUTS SEG NFR BLD: 64 % (ref 32–75)
NITRITE UR QL STRIP.AUTO: NEGATIVE
NRBC # BLD: 0 K/UL (ref 0–0.01)
NRBC BLD-RTO: 0 PER 100 WBC
PH UR STRIP: 6 [PH] (ref 5–8)
PLATELET # BLD AUTO: 238 K/UL (ref 150–400)
PMV BLD AUTO: 8.9 FL (ref 8.9–12.9)
PROT UR STRIP-MCNC: NEGATIVE MG/DL
RBC # BLD AUTO: 4.31 M/UL (ref 3.8–5.2)
SP GR UR REFRACTOMETRY: 1.02 (ref 1–1.03)
UROBILINOGEN UR QL STRIP.AUTO: 0.2 EU/DL (ref 0.2–1)
WBC # BLD AUTO: 7.1 K/UL (ref 3.6–11)

## 2022-12-01 PROCEDURE — 99283 EMERGENCY DEPT VISIT LOW MDM: CPT

## 2022-12-01 PROCEDURE — 85025 COMPLETE CBC W/AUTO DIFF WBC: CPT

## 2022-12-01 PROCEDURE — 80048 BASIC METABOLIC PNL TOTAL CA: CPT

## 2022-12-01 PROCEDURE — 81001 URINALYSIS AUTO W/SCOPE: CPT

## 2022-12-01 PROCEDURE — 87804 INFLUENZA ASSAY W/OPTIC: CPT

## 2022-12-02 VITALS
HEIGHT: 62 IN | DIASTOLIC BLOOD PRESSURE: 95 MMHG | OXYGEN SATURATION: 97 % | HEART RATE: 82 BPM | TEMPERATURE: 98.6 F | RESPIRATION RATE: 17 BRPM | BODY MASS INDEX: 36.92 KG/M2 | SYSTOLIC BLOOD PRESSURE: 133 MMHG | WEIGHT: 200.62 LBS

## 2022-12-02 LAB
ANION GAP SERPL CALC-SCNC: 4 MMOL/L (ref 5–15)
BACTERIA URNS QL MICRO: NEGATIVE /HPF
BUN SERPL-MCNC: 14 MG/DL (ref 6–20)
BUN/CREAT SERPL: 17 (ref 12–20)
CA-I BLD-MCNC: 8.6 MG/DL (ref 8.5–10.1)
CHLORIDE SERPL-SCNC: 104 MMOL/L (ref 97–108)
CO2 SERPL-SCNC: 24 MMOL/L (ref 21–32)
CREAT SERPL-MCNC: 0.82 MG/DL (ref 0.55–1.02)
FLUAV AG NPH QL IA: NEGATIVE
FLUBV AG NOSE QL IA: NEGATIVE
GLUCOSE SERPL-MCNC: 104 MG/DL (ref 65–100)
POTASSIUM SERPL-SCNC: 3.8 MMOL/L (ref 3.5–5.1)
RBC #/AREA URNS HPF: NORMAL /HPF (ref 0–3)
SODIUM SERPL-SCNC: 132 MMOL/L (ref 136–145)
WBC URNS QL MICRO: NORMAL /HPF (ref 0–5)

## 2022-12-02 PROCEDURE — 74011250637 HC RX REV CODE- 250/637: Performed by: EMERGENCY MEDICINE

## 2022-12-02 RX ORDER — CLONIDINE HYDROCHLORIDE 0.1 MG/1
0.1 TABLET ORAL
Status: COMPLETED | OUTPATIENT
Start: 2022-12-02 | End: 2022-12-02

## 2022-12-02 RX ADMIN — CLONIDINE HYDROCHLORIDE 0.1 MG: 0.1 TABLET ORAL at 00:30

## 2022-12-02 NOTE — ED PROVIDER NOTES
Patient presents with complaint of 2 episodes of dizziness tonight. Also C/O left shoulder pain . No fever, shortness of breath or diaphoresis. No other associated signs or symptoms. Past Medical History:   Diagnosis Date    Hyperlipidemia     Hypertension        Past Surgical History:   Procedure Laterality Date    COLONOSCOPY N/A 11/11/2021    COLONOSCOPY performed by Yaritza Nettles MD at [address] [place] [address] ENDOSCOPY    HX TUBAL LIGATION           Family History:   Problem Relation Age of Onset    Prostate Cancer Father        Social History     Socioeconomic History    Marital status:      Spouse name: Not on file    Number of children: Not on file    Years of education: Not on file    Highest education level: Not on file   Occupational History    Not on file   Tobacco Use    Smoking status: Never    Smokeless tobacco: Never   Vaping Use    Vaping Use: Never used   Substance and Sexual Activity    Alcohol use: Never    Drug use: Never    Sexual activity: Yes     Partners: Male     Birth control/protection: Pill   Other Topics Concern    Not on file   Social History Narrative    Not on file     Social Determinants of Health     Financial Resource Strain: Not on file   Food Insecurity: Not on file   Transportation Needs: Not on file   Physical Activity: Not on file   Stress: Not on file   Social Connections: Not on file   Intimate Partner Violence: Not on file   Housing Stability: Not on file         ALLERGIES: Ciprofloxacin    Review of Systems   Constitutional: Negative. HENT: Negative. Eyes: Negative. Respiratory: Negative. Cardiovascular: Negative. Gastrointestinal: Negative. Endocrine: Negative. Genitourinary: Negative. Skin: Negative. Allergic/Immunologic: Negative. Neurological:  Positive for dizziness. Hematological: Negative. Psychiatric/Behavioral: Negative. All other systems reviewed and are negative.     Vitals:    12/01/22 2306   BP: (!) 176/87   Pulse: (!) 101 Resp: 17   Temp: 98.6 °F (37 °C)   SpO2: 97%   Weight: 91 kg (200 lb 9.9 oz)   Height: 5' 2\" (1.575 m)            Physical Exam     MDM         Procedures

## 2022-12-02 NOTE — ED TRIAGE NOTES
Pt reports 2 episodes of dizziness this evening. Denies n/v. Also c/o L.  Shoulder pain-says it may be related to the puzzle she's been working on

## 2024-09-11 ENCOUNTER — HOSPITAL ENCOUNTER (EMERGENCY)
Facility: HOSPITAL | Age: 44
Discharge: HOME | End: 2024-09-11
Attending: EMERGENCY MEDICINE
Payer: COMMERCIAL

## 2024-09-11 ENCOUNTER — APPOINTMENT (OUTPATIENT)
Facility: HOSPITAL | Age: 44
End: 2024-09-11
Payer: COMMERCIAL

## 2024-09-11 VITALS
OXYGEN SATURATION: 99 % | TEMPERATURE: 97.9 F | DIASTOLIC BLOOD PRESSURE: 77 MMHG | SYSTOLIC BLOOD PRESSURE: 135 MMHG | RESPIRATION RATE: 18 BRPM | WEIGHT: 187 LBS | BODY MASS INDEX: 34.41 KG/M2 | HEIGHT: 62 IN | HEART RATE: 95 BPM

## 2024-09-11 DIAGNOSIS — R10.31 RIGHT LOWER QUADRANT ABDOMINAL PAIN: Primary | ICD-10-CM

## 2024-09-11 LAB
ALBUMIN SERPL-MCNC: 3 G/DL (ref 3.5–5)
ALBUMIN/GLOB SERPL: 0.9 (ref 1.1–2.2)
ALP SERPL-CCNC: 87 U/L (ref 45–117)
ALT SERPL-CCNC: 39 U/L (ref 12–78)
ANION GAP SERPL CALC-SCNC: 10 MMOL/L (ref 2–12)
AST SERPL W P-5'-P-CCNC: 19 U/L (ref 15–37)
BASOPHILS # BLD: 0 K/UL (ref 0–0.1)
BASOPHILS NFR BLD: 0 % (ref 0–1)
BILIRUB SERPL-MCNC: 0.7 MG/DL (ref 0.2–1)
BUN SERPL-MCNC: 14 MG/DL (ref 6–20)
BUN/CREAT SERPL: 17 (ref 12–20)
CA-I BLD-MCNC: 8.2 MG/DL (ref 8.5–10.1)
CHLORIDE SERPL-SCNC: 102 MMOL/L (ref 97–108)
CO2 SERPL-SCNC: 24 MMOL/L (ref 21–32)
CREAT SERPL-MCNC: 0.82 MG/DL (ref 0.55–1.02)
DIFFERENTIAL METHOD BLD: NORMAL
EOSINOPHIL # BLD: 0 K/UL (ref 0–0.4)
EOSINOPHIL NFR BLD: 1 % (ref 0–7)
ERYTHROCYTE [DISTWIDTH] IN BLOOD BY AUTOMATED COUNT: 12.6 % (ref 11.5–14.5)
GLOBULIN SER CALC-MCNC: 3.4 G/DL (ref 2–4)
GLUCOSE SERPL-MCNC: 100 MG/DL (ref 65–100)
HCT VFR BLD AUTO: 38 % (ref 35–47)
HGB BLD-MCNC: 13.6 G/DL (ref 11.5–16)
IMM GRANULOCYTES # BLD AUTO: 0 K/UL (ref 0–0.04)
IMM GRANULOCYTES NFR BLD AUTO: 0 % (ref 0–0.5)
LIPASE SERPL-CCNC: 22 U/L (ref 13–75)
LYMPHOCYTES # BLD: 1.6 K/UL (ref 0.8–3.5)
LYMPHOCYTES NFR BLD: 30 % (ref 12–49)
MAGNESIUM SERPL-MCNC: 1.7 MG/DL (ref 1.6–2.4)
MCH RBC QN AUTO: 31.3 PG (ref 26–34)
MCHC RBC AUTO-ENTMCNC: 35.8 G/DL (ref 30–36.5)
MCV RBC AUTO: 87.4 FL (ref 80–99)
MONOCYTES # BLD: 0.4 K/UL (ref 0–1)
MONOCYTES NFR BLD: 8 % (ref 5–13)
NEUTS SEG # BLD: 3.4 K/UL (ref 1.8–8)
NEUTS SEG NFR BLD: 61 % (ref 32–75)
NRBC # BLD: 0 K/UL (ref 0–0.01)
NRBC BLD-RTO: 0 PER 100 WBC
PLATELET # BLD AUTO: 221 K/UL (ref 150–400)
PMV BLD AUTO: 9.1 FL (ref 8.9–12.9)
POTASSIUM SERPL-SCNC: 3.7 MMOL/L (ref 3.5–5.1)
PROT SERPL-MCNC: 6.4 G/DL (ref 6.4–8.2)
RBC # BLD AUTO: 4.35 M/UL (ref 3.8–5.2)
SODIUM SERPL-SCNC: 136 MMOL/L (ref 136–145)
WBC # BLD AUTO: 5.4 K/UL (ref 3.6–11)

## 2024-09-11 PROCEDURE — 83690 ASSAY OF LIPASE: CPT

## 2024-09-11 PROCEDURE — 99285 EMERGENCY DEPT VISIT HI MDM: CPT

## 2024-09-11 PROCEDURE — 6360000004 HC RX CONTRAST MEDICATION: Performed by: EMERGENCY MEDICINE

## 2024-09-11 PROCEDURE — 80053 COMPREHEN METABOLIC PANEL: CPT

## 2024-09-11 PROCEDURE — 6360000002 HC RX W HCPCS: Performed by: EMERGENCY MEDICINE

## 2024-09-11 PROCEDURE — 96375 TX/PRO/DX INJ NEW DRUG ADDON: CPT

## 2024-09-11 PROCEDURE — 85025 COMPLETE CBC W/AUTO DIFF WBC: CPT

## 2024-09-11 PROCEDURE — 36415 COLL VENOUS BLD VENIPUNCTURE: CPT

## 2024-09-11 PROCEDURE — 2580000003 HC RX 258: Performed by: EMERGENCY MEDICINE

## 2024-09-11 PROCEDURE — 74177 CT ABD & PELVIS W/CONTRAST: CPT

## 2024-09-11 PROCEDURE — 96374 THER/PROPH/DIAG INJ IV PUSH: CPT

## 2024-09-11 PROCEDURE — 83735 ASSAY OF MAGNESIUM: CPT

## 2024-09-11 RX ORDER — 0.9 % SODIUM CHLORIDE 0.9 %
999 INTRAVENOUS SOLUTION INTRAVENOUS ONCE
Status: COMPLETED | OUTPATIENT
Start: 2024-09-11 | End: 2024-09-11

## 2024-09-11 RX ORDER — IBUPROFEN 600 MG/1
600 TABLET, FILM COATED ORAL 4 TIMES DAILY PRN
Qty: 20 TABLET | Refills: 0 | Status: SHIPPED | OUTPATIENT
Start: 2024-09-11 | End: 2024-09-16

## 2024-09-11 RX ORDER — KETOROLAC TROMETHAMINE 30 MG/ML
30 INJECTION, SOLUTION INTRAMUSCULAR; INTRAVENOUS
Status: COMPLETED | OUTPATIENT
Start: 2024-09-11 | End: 2024-09-11

## 2024-09-11 RX ORDER — IOPAMIDOL 755 MG/ML
100 INJECTION, SOLUTION INTRAVASCULAR
Status: COMPLETED | OUTPATIENT
Start: 2024-09-11 | End: 2024-09-11

## 2024-09-11 RX ADMIN — WATER 1000 MG: 1 INJECTION INTRAMUSCULAR; INTRAVENOUS; SUBCUTANEOUS at 09:17

## 2024-09-11 RX ADMIN — KETOROLAC TROMETHAMINE 30 MG: 30 INJECTION, SOLUTION INTRAMUSCULAR at 09:17

## 2024-09-11 RX ADMIN — IOPAMIDOL 100 ML: 755 INJECTION, SOLUTION INTRAVENOUS at 10:04

## 2024-09-11 RX ADMIN — SODIUM CHLORIDE 1000 ML: 9 INJECTION, SOLUTION INTRAVENOUS at 09:18

## 2024-09-11 ASSESSMENT — PAIN DESCRIPTION - DESCRIPTORS: DESCRIPTORS: ACHING

## 2024-09-11 ASSESSMENT — PAIN DESCRIPTION - PAIN TYPE: TYPE: ACUTE PAIN

## 2024-09-11 ASSESSMENT — PAIN SCALES - GENERAL: PAINLEVEL_OUTOF10: 2

## 2024-09-11 ASSESSMENT — PAIN - FUNCTIONAL ASSESSMENT
PAIN_FUNCTIONAL_ASSESSMENT: 0-10
PAIN_FUNCTIONAL_ASSESSMENT: PREVENTS OR INTERFERES SOME ACTIVE ACTIVITIES AND ADLS

## 2024-09-11 ASSESSMENT — PAIN DESCRIPTION - LOCATION: LOCATION: ABDOMEN

## 2024-09-11 ASSESSMENT — PAIN DESCRIPTION - FREQUENCY: FREQUENCY: CONTINUOUS

## 2024-09-11 ASSESSMENT — PAIN DESCRIPTION - ONSET: ONSET: ON-GOING
